# Patient Record
Sex: FEMALE | Race: WHITE | Employment: FULL TIME | URBAN - METROPOLITAN AREA
[De-identification: names, ages, dates, MRNs, and addresses within clinical notes are randomized per-mention and may not be internally consistent; named-entity substitution may affect disease eponyms.]

---

## 2017-01-23 ENCOUNTER — TELEPHONE (OUTPATIENT)
Dept: NEUROLOGY | Facility: CLINIC | Age: 37
End: 2017-01-23

## 2017-01-23 NOTE — TELEPHONE ENCOUNTER
Viridiana WISE requested by [facility] pharmacy via cover my meds with [address]

## 2017-01-25 NOTE — TELEPHONE ENCOUNTER
Fax received from [address] [place] [address]. Authorization received for Adderall XR. Approval effective 01/24/2017 through 01/24/2020, as long as patient remains covered by current prescription drug plan.     Plan member ID: [id number]

## 2017-02-15 NOTE — TELEPHONE ENCOUNTER
Medication: Adderall XR 30 mg daily    Date of last refill: 11/11/2016 90 day panel  [place]  68/45/2206 fpr 30/30 day supply    Last office visit: 11/28/2016  Due back to clinic per last office note:  RTC in 6 months  D

## 2017-02-22 DIAGNOSIS — G43.109 MIGRAINE EQUIVALENT: Primary | ICD-10-CM

## 2017-02-22 RX ORDER — TOPIRAMATE 100 MG/1
100 TABLET, FILM COATED ORAL
Qty: 90 TABLET | Refills: 1 | Status: SHIPPED | OUTPATIENT
Start: 2017-02-22 | End: 2017-11-03 | Stop reason: DRUGHIGH

## 2017-02-22 NOTE — TELEPHONE ENCOUNTER
90 day panel set up for patient. Given prescriptions for March, April and May. Did not receive the Feb prescription. Patient requesting prescription for Feb.    Mother may  prescription for patient.

## 2017-02-22 NOTE — TELEPHONE ENCOUNTER
Medication:Topirimate 100 mg    Date of last refill: 03/09/16 # 90 with 3 addt refills  [place]      Last office visit: 11/28/2016  Due back to clinic per last office note:  RTC in 6 months  Date next office visit schedu

## 2017-03-16 RX ORDER — RIZATRIPTAN BENZOATE 10 MG/1
TABLET ORAL
Qty: 27 TABLET | Refills: 1 | Status: SHIPPED | OUTPATIENT
Start: 2017-03-16 | End: 2017-06-01

## 2017-03-16 NOTE — TELEPHONE ENCOUNTER
Medication:Rizatriptan    Date of last refill: 11/28/16 # 27  [place]      Last office visit: 11/28/2016  Due back to clinic per last office note:  RTC in 6 months  Date next office visit scheduled:  5/15/2017    Last OV

## 2017-05-15 NOTE — PROGRESS NOTES
[facility] with [address]  4/28/1980  Primary Care Provider:  Lucien Birch MD    5/15/2017    30year old yo patient being seen for:  Migraine and ADD    One day in December, h mmHg  Pulse 65  Resp 16  Ht 66.5\"  Wt 274 lb  BMI 43.57 kg/m2  Looks stated age  Pink conjunctiva anicteric sclerae, moist mucosa  No LAD, no thyromegaly  Lungs clear breath sounds  Heart S1S2, no abnormal sounds  Pink nailbeds no Cyanosis, pulses palpate

## 2017-05-15 NOTE — PATIENT INSTRUCTIONS
Refill policies:    • Allow 2 business days for refills; controlled substances may take longer.   • Contact your pharmacy at least 5 days prior to running out of medication and have them send an electronic request or submit request through the “request re insurance carrier to obtain pre-certification or prior authorization. Unfortunately, [facility] has seen an increase in denial of payment even though the procedure/test has been pre-certified.   You are strongly encouraged to contact your insurance carrier to v

## 2017-05-19 ENCOUNTER — EXTERNAL RECORD (OUTPATIENT)
Dept: HEALTH INFORMATION MANAGEMENT | Facility: OTHER | Age: 37
End: 2017-05-19

## 2017-05-24 NOTE — TELEPHONE ENCOUNTER
Prescriptions printed at office visit with expiration date of May 15, 2017. Pharmacy will not fill. Patient to present to office for new prescriptions and return previous prescriptions. Patient requesting the 90 day panel.

## 2017-05-24 NOTE — TELEPHONE ENCOUNTER
Patient returned prescriptions in exchange for new printed Adderall scripts. Prescriptions destroyed by nursing.

## 2017-06-01 RX ORDER — RIZATRIPTAN BENZOATE 10 MG/1
TABLET ORAL
Qty: 27 TABLET | Refills: 1 | Status: SHIPPED | OUTPATIENT
Start: 2017-06-01 | End: 2017-09-12

## 2017-06-01 NOTE — TELEPHONE ENCOUNTER
Medication: Rizatriptan 10mg    Date of last refill: 03/16/17 with 1 addt refill #27   [place]     Last office visit: 5/15/2017  Due back to clinic per last office note:  RTN in 6 months by 11/15/17  Date next office vis

## 2017-09-02 ENCOUNTER — PRIOR ORIGINAL RECORDS (OUTPATIENT)
Dept: OTHER | Age: 37
End: 2017-09-02

## 2017-09-02 ENCOUNTER — HOSPITAL ENCOUNTER (OUTPATIENT)
Age: 37
Discharge: HOME | End: 2017-09-02
Attending: FAMILY MEDICINE
Payer: COMMERCIAL

## 2017-09-02 VITALS
BODY MASS INDEX: 45 KG/M2 | DIASTOLIC BLOOD PRESSURE: 57 MMHG | SYSTOLIC BLOOD PRESSURE: 120 MMHG | RESPIRATION RATE: 16 BRPM | HEART RATE: 84 BPM | WEIGHT: 285 LBS | OXYGEN SATURATION: 98 % | TEMPERATURE: 97 F

## 2017-09-02 DIAGNOSIS — R00.2 PALPITATIONS: Primary | ICD-10-CM

## 2017-09-02 DIAGNOSIS — F41.9 ANXIETY: ICD-10-CM

## 2017-09-02 LAB
#LYMPHOCYTE IC: 1.6 X10ˆ3/UL (ref 0.9–3.2)
#MXD IC: 0.6 X10ˆ3/UL (ref 0.1–1)
#NEUTROPHIL IC: 4.6 X10ˆ3/UL (ref 1.3–6.7)
ATRIAL RATE: 71 BPM
CREAT SERPL-MCNC: 0.6 MG/DL (ref 0.4–1)
GLUCOSE BLD-MCNC: 95 MG/DL (ref 65–99)
HCT IC: 43.4 % (ref 37–54)
HGB IC: 14.5 G/DL (ref 12–16)
ISTAT BLOOD GAS TCO2: 25 MMOL/L (ref 22–32)
ISTAT BUN: 8 MG/DL (ref 8–20)
ISTAT CHLORIDE: 102 MMOL/L (ref 101–111)
ISTAT HEMATOCRIT: 44 % (ref 37–54)
ISTAT IONIZED CALCIUM: 1.19 MMOL/L (ref 1.12–1.32)
ISTAT POTASSIUM: 4.1 MMOL/L (ref 3.6–5.1)
ISTAT SODIUM: 139 MMOL/L (ref 136–144)
ISTAT TROPONIN: <0.1 NG/ML (ref ?–0.1)
LYMPHOCYTES NFR BLD AUTO: 24 %
MCH IC: 28.7 PG (ref 27–33.2)
MCHC IC: 33.4 G/DL (ref 31–37)
MCV IC: 85.9 FL (ref 81–100)
MIXED CELL %: 9.5 %
NEUTROPHILS NFR BLD AUTO: 66.5 %
P AXIS: -10 DEGREES
P-R INTERVAL: 136 MS
PLT IC: 343 X10ˆ3/UL (ref 150–450)
Q-T INTERVAL: 382 MS
QRS DURATION: 78 MS
QTC CALCULATION (BEZET): 415 MS
R AXIS: 39 DEGREES
RBC IC: 5.05 X10ˆ6/UL (ref 3.8–5.1)
T AXIS: 19 DEGREES
TSI SER-ACNC: 2.46 MIU/ML (ref 0.35–5.5)
VENTRICULAR RATE: 71 BPM
WBC IC: 6.8 X10ˆ3/UL (ref 4–13)

## 2017-09-02 PROCEDURE — 93005 ELECTROCARDIOGRAM TRACING: CPT

## 2017-09-02 PROCEDURE — 84484 ASSAY OF TROPONIN QUANT: CPT

## 2017-09-02 PROCEDURE — 84443 ASSAY THYROID STIM HORMONE: CPT | Performed by: FAMILY MEDICINE

## 2017-09-02 PROCEDURE — 80047 BASIC METABLC PNL IONIZED CA: CPT

## 2017-09-02 PROCEDURE — 85025 COMPLETE CBC W/AUTO DIFF WBC: CPT | Performed by: FAMILY MEDICINE

## 2017-09-02 PROCEDURE — 99204 OFFICE O/P NEW MOD 45 MIN: CPT

## 2017-09-02 PROCEDURE — 36415 COLL VENOUS BLD VENIPUNCTURE: CPT

## 2017-09-02 PROCEDURE — 93010 ELECTROCARDIOGRAM REPORT: CPT

## 2017-09-02 RX ORDER — ALPRAZOLAM 0.25 MG/1
0.25 TABLET ORAL 3 TIMES DAILY PRN
Qty: 15 TABLET | Refills: 0 | Status: SHIPPED | OUTPATIENT
Start: 2017-09-02 | End: 2017-09-17

## 2017-09-02 NOTE — ED PROVIDER NOTES
Patient Seen in: [id number] [facility]    History   Patient presents with:  Arrythmia/Palpitations (cardiovascular)    Stated Complaint: heart palpitation    HPI    79-year-old female presents to the clinic today with chief complaints of interm cognitive impairment, so stated    • Nontoxic uninodular goiter    • Other B-complex deficiencies    • Other nonspecific findings on examination of blood(790.99)        Past Surgical History:  08/24/2016: ANKLE FRACTURE SURGERY      Comment: Right  2006:  B Temporal  SpO2: 98 %  O2 Device: None (Room air)    Current:/57   Pulse 84   Temp (!) 97.1 °F (36.2 °C) (Temporal)   Resp 16   Wt 129.3 kg   SpO2 98%   BMI 45.31 kg/m²         Physical Exam    General appearance: alert, appears stated age and coopera versus hypothyroidism. She is currently on the Synthroid. Last TSH was in May and noted to be normal. Await TSH results. Patient was also instructed to follow-up with PCP and consider getting a Holter monitor done to rule out any cardiac arrhythmias.   P

## 2017-09-02 NOTE — ED NOTES
Patient is tearful during assessment. Denies pain. States she is under a lot of stress lately. No sob, dizziness, nausea, or cp at the moment.

## 2017-09-02 NOTE — ED INITIAL ASSESSMENT (HPI)
Patient states she has been having intermittent palpitations for the past few weeks. States last night while lying on her cough, she noticed that she felt the palpitations. Patient denies sob, dizziness, nausea, and cp.  States she has been under a lot of s

## 2017-09-05 ENCOUNTER — PRIOR ORIGINAL RECORDS (OUTPATIENT)
Dept: OTHER | Age: 37
End: 2017-09-05

## 2017-09-05 ENCOUNTER — TELEPHONE (OUTPATIENT)
Dept: NEUROLOGY | Facility: CLINIC | Age: 37
End: 2017-09-05

## 2017-09-05 LAB
BUN: 8 MG/DL
CHLORIDE: 102 MEQ/L
CREATININE, SERUM: 0.6 MG/DL
GLUCOSE: 95 MG/DL
HEMATOCRIT: 43.4 %
HEMOGLOBIN: 14.5 G/DL
PLATELETS: 343 K/UL
POTASSIUM, SERUM: 4.1 MEQ/L
RED BLOOD COUNT: 5.05 X 10-6/U
SODIUM: 139 MEQ/L
THYROID STIMULATING HORMONE: 2.46 MLU/L
WHITE BLOOD COUNT: 6.8 X 10-3/U

## 2017-09-05 NOTE — TELEPHONE ENCOUNTER
Medication: adderall    Date of last refill: 8/22/17  [place] 9/07/89    Last office visit: 5/15/17  Due back to clinic per last office note:  6 monhts  Date next office visit scheduled:  Future Appointments  Date Time P

## 2017-09-05 NOTE — TELEPHONE ENCOUNTER
From: Manny Blankenship  To: Jewels Lovelace [place]  Sent: 9/5/2017 7:57 AM CDT  Subject: Prescription Question    Rx request for Adderall 20mg time release (at least that is what I think my dosage is).  For Sept, Oct, & Nov. last time filled was the end of July

## 2017-09-07 ENCOUNTER — HOSPITAL ENCOUNTER (OUTPATIENT)
Dept: CV DIAGNOSTICS | Facility: HOSPITAL | Age: 37
Discharge: HOME | End: 2017-09-07
Attending: INTERNAL MEDICINE
Payer: COMMERCIAL

## 2017-09-07 DIAGNOSIS — R00.2 PALPITATIONS: ICD-10-CM

## 2017-09-07 PROCEDURE — 93271 ECG/MONITORING AND ANALYSIS: CPT | Performed by: INTERNAL MEDICINE

## 2017-09-07 PROCEDURE — 93270 REMOTE 30 DAY ECG REV/REPORT: CPT | Performed by: INTERNAL MEDICINE

## 2017-09-07 PROCEDURE — 93272 ECG/REVIEW INTERPRET ONLY: CPT | Performed by: INTERNAL MEDICINE

## 2017-09-12 DIAGNOSIS — G43.109 MIGRAINE EQUIVALENT: Primary | ICD-10-CM

## 2017-09-12 RX ORDER — RIZATRIPTAN BENZOATE 10 MG/1
TABLET ORAL
Qty: 27 TABLET | Refills: 0 | Status: SHIPPED | OUTPATIENT
Start: 2017-09-12 | End: 2017-12-01

## 2017-09-12 NOTE — TELEPHONE ENCOUNTER
Medication: Rizatriptan    Date of last refill: 6/1/17 #27 with 1 refill  [place]    Last office visit: 5/15/2017  Due back to clinic per last office note:  6 mos  Date next office visit scheduled:  Future Appointment

## 2017-10-03 ENCOUNTER — HOSPITAL ENCOUNTER (OUTPATIENT)
Dept: CV DIAGNOSTICS | Facility: HOSPITAL | Age: 37
Discharge: HOME | End: 2017-10-03
Attending: INTERNAL MEDICINE

## 2017-10-03 DIAGNOSIS — R00.2 PALPITATIONS: ICD-10-CM

## 2017-10-19 ENCOUNTER — PRIOR ORIGINAL RECORDS (OUTPATIENT)
Dept: OTHER | Age: 37
End: 2017-10-19

## 2017-10-27 ENCOUNTER — PRIOR ORIGINAL RECORDS (OUTPATIENT)
Dept: OTHER | Age: 37
End: 2017-10-27

## 2017-11-03 RX ORDER — TOPIRAMATE 25 MG/1
TABLET ORAL
Qty: 90 TABLET | Refills: 2 | Status: SHIPPED | OUTPATIENT
Start: 2017-11-03 | End: 2017-11-13

## 2017-11-13 ENCOUNTER — OFFICE VISIT (OUTPATIENT)
Dept: NEUROLOGY | Facility: CLINIC | Age: 37
End: 2017-11-13

## 2017-11-13 VITALS
BODY MASS INDEX: 46.53 KG/M2 | HEIGHT: 66.5 IN | SYSTOLIC BLOOD PRESSURE: 134 MMHG | HEART RATE: 82 BPM | RESPIRATION RATE: 16 BRPM | WEIGHT: 293 LBS | DIASTOLIC BLOOD PRESSURE: 79 MMHG

## 2017-11-13 DIAGNOSIS — G43.109 MIGRAINE EQUIVALENT: Primary | ICD-10-CM

## 2017-11-13 DIAGNOSIS — F98.8 ATTENTION DEFICIT DISORDER (ADD) WITHOUT HYPERACTIVITY: ICD-10-CM

## 2017-11-13 DIAGNOSIS — F90.8 ATTENTION DEFICIT HYPERACTIVITY DISORDER (ADHD), OTHER TYPE: ICD-10-CM

## 2017-11-13 PROCEDURE — 99214 OFFICE O/P EST MOD 30 MIN: CPT | Performed by: OTHER

## 2017-11-13 RX ORDER — DEXTROAMPHETAMINE SACCHARATE, AMPHETAMINE ASPARTATE MONOHYDRATE, DEXTROAMPHETAMINE SULFATE AND AMPHETAMINE SULFATE 5; 5; 5; 5 MG/1; MG/1; MG/1; MG/1
20 CAPSULE, EXTENDED RELEASE ORAL DAILY
Qty: 30 CAPSULE | Refills: 0 | Status: SHIPPED | OUTPATIENT
Start: 2017-11-13 | End: 2017-11-13

## 2017-11-13 RX ORDER — LEVOTHYROXINE SODIUM 0.1 MG/1
100 TABLET ORAL
COMMUNITY
End: 2019-12-19 | Stop reason: DRUGHIGH

## 2017-11-13 RX ORDER — DEXTROAMPHETAMINE SACCHARATE, AMPHETAMINE ASPARTATE MONOHYDRATE, DEXTROAMPHETAMINE SULFATE AND AMPHETAMINE SULFATE 5; 5; 5; 5 MG/1; MG/1; MG/1; MG/1
20 CAPSULE, EXTENDED RELEASE ORAL DAILY
Qty: 30 CAPSULE | Refills: 0 | Status: SHIPPED | OUTPATIENT
Start: 2017-12-13 | End: 2017-11-13

## 2017-11-13 RX ORDER — DEXTROAMPHETAMINE SACCHARATE, AMPHETAMINE ASPARTATE MONOHYDRATE, DEXTROAMPHETAMINE SULFATE AND AMPHETAMINE SULFATE 5; 5; 5; 5 MG/1; MG/1; MG/1; MG/1
20 CAPSULE, EXTENDED RELEASE ORAL DAILY
Qty: 30 CAPSULE | Refills: 0 | Status: SHIPPED | OUTPATIENT
Start: 2018-01-13 | End: 2018-02-06

## 2017-11-13 NOTE — PATIENT INSTRUCTIONS
Refill policies:    • Allow 2-3 business days for refills; controlled substances may take longer.   • Contact your pharmacy at least 5 days prior to running out of medication and have them send an electronic request or submit request through the [facility] have a procedure or additional testing performed. [facility]) will contact your insurance carrier to obtain pre-certification or prior authorization.     Unfortunately, [facility] has seen an increase in denial of payment even though the p

## 2017-11-13 NOTE — PROGRESS NOTES
[facility] with [address]  4/28/1980  Primary Care Provider:  New Chapman MD    11/13/2017    30year old yo patient being seen for:  migraines    Averages 3 a month  No cross S1S2, no abnormal sounds  Pink nailbeds no Cyanosis, pulses palpated    Neuro: Alert oriented with normal CN 2-12. No motor and sensory deficits. DTRs were symmetric and no upper motor neuron signs.  Coordination was normal.      IMPRESSION & PLANS:  Curt Malone

## 2017-11-24 ENCOUNTER — CHARTING TRANS (OUTPATIENT)
Dept: OTHER | Age: 37
End: 2017-11-24

## 2017-12-01 DIAGNOSIS — G43.109 MIGRAINE EQUIVALENT: ICD-10-CM

## 2017-12-01 RX ORDER — RIZATRIPTAN BENZOATE 10 MG/1
TABLET ORAL
Qty: 27 TABLET | Refills: 0 | Status: SHIPPED | OUTPATIENT
Start: 2017-12-01 | End: 2018-01-13

## 2018-01-09 ENCOUNTER — IMAGING SERVICES (OUTPATIENT)
Dept: OTHER | Age: 38
End: 2018-01-09

## 2018-01-09 ENCOUNTER — CHARTING TRANS (OUTPATIENT)
Dept: OTHER | Age: 38
End: 2018-01-09

## 2018-01-13 DIAGNOSIS — G43.109 MIGRAINE EQUIVALENT: ICD-10-CM

## 2018-01-15 RX ORDER — RIZATRIPTAN BENZOATE 10 MG/1
TABLET ORAL
Qty: 27 TABLET | Refills: 1 | Status: SHIPPED | OUTPATIENT
Start: 2018-01-15 | End: 2018-04-04

## 2018-01-15 NOTE — TELEPHONE ENCOUNTER
Spoke to patient and she only got 18 tabs out and she may not have insurance in a month and would like a refill

## 2018-01-15 NOTE — TELEPHONE ENCOUNTER
Medication: Rizatriptan 10 mg    Date of last refill: 12/01/17  [place]     Last office visit: 11/13/2017  Due back to clinic per last office note:  RTN in 6 months  Date next office visit scheduled:  Future Appointments

## 2018-01-18 DIAGNOSIS — G43.109 MIGRAINE EQUIVALENT: Primary | ICD-10-CM

## 2018-01-18 RX ORDER — TOPIRAMATE 25 MG/1
TABLET ORAL
Qty: 270 TABLET | Refills: 0 | Status: SHIPPED | OUTPATIENT
Start: 2018-01-18 | End: 2018-11-13 | Stop reason: DRUGHIGH

## 2018-01-18 NOTE — TELEPHONE ENCOUNTER
Medication:Topiramate 25 mg     Date of last refill: 11/03/17 with 2 addt refills  [place]      Last office visit: 11/13/2017  Due back to clinic per last office note:  RTN in 6 months  Date next office visit scheduled:

## 2018-01-18 NOTE — TELEPHONE ENCOUNTER
Spoke to patient and she is requesting a 90 day supply on Topomax due to not may have insurance.  She is aware that she is to wean off per last visit with Dr Castillo Cowart

## 2018-01-30 ENCOUNTER — LAB SERVICES (OUTPATIENT)
Dept: OTHER | Age: 38
End: 2018-01-30

## 2018-01-30 LAB — PREGNANCY TEST, URINE: NEGATIVE

## 2018-01-31 ENCOUNTER — CHARTING TRANS (OUTPATIENT)
Dept: OTHER | Age: 38
End: 2018-01-31

## 2018-02-01 LAB — AP REPORT: NORMAL

## 2018-02-03 ENCOUNTER — PATIENT MESSAGE (OUTPATIENT)
Dept: NEUROLOGY | Facility: CLINIC | Age: 38
End: 2018-02-03

## 2018-02-05 ENCOUNTER — CHARTING TRANS (OUTPATIENT)
Dept: OTHER | Age: 38
End: 2018-02-05

## 2018-02-05 NOTE — TELEPHONE ENCOUNTER
From: Kilo Lunch  To: Eliazar Collet, MD  Sent: 2/3/2018 1:14 AM CST  Subject: Prescription Question    Rx request for Adderall 20mg time release. I am normally given 3 scripts at a time. I am due for another fill in 2/14?     Can you please send me

## 2018-02-06 ENCOUNTER — OFFICE VISIT (OUTPATIENT)
Dept: NEUROLOGY | Facility: CLINIC | Age: 38
End: 2018-02-06

## 2018-02-06 VITALS
DIASTOLIC BLOOD PRESSURE: 80 MMHG | HEIGHT: 66.5 IN | RESPIRATION RATE: 16 BRPM | BODY MASS INDEX: 46.53 KG/M2 | SYSTOLIC BLOOD PRESSURE: 124 MMHG | HEART RATE: 82 BPM | WEIGHT: 293 LBS

## 2018-02-06 DIAGNOSIS — G43.109 MIGRAINE EQUIVALENT: Primary | ICD-10-CM

## 2018-02-06 DIAGNOSIS — F90.0 ATTENTION DEFICIT HYPERACTIVITY DISORDER (ADHD), PREDOMINANTLY INATTENTIVE TYPE: ICD-10-CM

## 2018-02-06 DIAGNOSIS — F98.8 ATTENTION DEFICIT DISORDER (ADD) WITHOUT HYPERACTIVITY: ICD-10-CM

## 2018-02-06 PROCEDURE — 99214 OFFICE O/P EST MOD 30 MIN: CPT | Performed by: OTHER

## 2018-02-06 RX ORDER — TRAMADOL HYDROCHLORIDE 50 MG/1
50 TABLET ORAL EVERY 6 HOURS PRN
Qty: 30 TABLET | Refills: 0 | Status: SHIPPED | OUTPATIENT
Start: 2018-02-06 | End: 2018-05-02

## 2018-02-06 RX ORDER — DEXTROAMPHETAMINE SACCHARATE, AMPHETAMINE ASPARTATE MONOHYDRATE, DEXTROAMPHETAMINE SULFATE AND AMPHETAMINE SULFATE 5; 5; 5; 5 MG/1; MG/1; MG/1; MG/1
20 CAPSULE, EXTENDED RELEASE ORAL DAILY
Qty: 30 CAPSULE | Refills: 0 | Status: SHIPPED | OUTPATIENT
Start: 2018-02-14 | End: 2018-02-06

## 2018-02-06 RX ORDER — DEXTROAMPHETAMINE SACCHARATE, AMPHETAMINE ASPARTATE MONOHYDRATE, DEXTROAMPHETAMINE SULFATE AND AMPHETAMINE SULFATE 5; 5; 5; 5 MG/1; MG/1; MG/1; MG/1
20 CAPSULE, EXTENDED RELEASE ORAL DAILY
Qty: 30 CAPSULE | Refills: 0 | Status: SHIPPED | OUTPATIENT
Start: 2018-03-14 | End: 2018-02-06

## 2018-02-06 RX ORDER — AMOXICILLIN AND CLAVULANATE POTASSIUM 875; 125 MG/1; MG/1
1 TABLET, FILM COATED ORAL 2 TIMES DAILY
COMMUNITY
End: 2018-05-02

## 2018-02-06 RX ORDER — METHYLPREDNISOLONE 4 MG/1
TABLET ORAL
Qty: 1 PACKAGE | Refills: 0 | Status: SHIPPED | OUTPATIENT
Start: 2018-02-06 | End: 2018-05-02

## 2018-02-06 RX ORDER — DEXTROAMPHETAMINE SACCHARATE, AMPHETAMINE ASPARTATE MONOHYDRATE, DEXTROAMPHETAMINE SULFATE AND AMPHETAMINE SULFATE 5; 5; 5; 5 MG/1; MG/1; MG/1; MG/1
20 CAPSULE, EXTENDED RELEASE ORAL DAILY
Qty: 30 CAPSULE | Refills: 0 | Status: SHIPPED | OUTPATIENT
Start: 2018-04-14 | End: 2018-05-02

## 2018-02-06 NOTE — PROGRESS NOTES
[facility] with [address]  4/28/1980  Primary Care Provider:  Heriberto Uriarte MD    2/6/2018    30year old yo patient being seen for:  headaches    Had turbinate surgery Thursday 50 mg by mouth daily. , Disp: , Rfl:   PRN:     Allergies:    Acetaminophen           Other (See Comments)    Comment:Elevated liver enzymes      During today's visit, the following items were reviewed: medications, and the following relevant information ar caregivers - -non F2F  (  ) Telephone time with patiern or authorized [employer]  (  ) other records reviewed --non F2F  (  ) Fam meetings - patient not present --non F2F  Non Face to Face CPT code 46994/49231 applies as documented above    PROCED

## 2018-02-06 NOTE — PATIENT INSTRUCTIONS
Refill policies:    • Allow 2-3 business days for refills; controlled substances may take longer.   • Contact your pharmacy at least 5 days prior to running out of medication and have them send an electronic request or submit request through the [facility] recommended that you have a procedure or additional testing performed. [facility]) will contact your insurance carrier to obtain pre-certification or prior authorization.     Unfortunately, [facility] has seen an increase in denial of paym

## 2018-04-04 DIAGNOSIS — G43.109 MIGRAINE EQUIVALENT: ICD-10-CM

## 2018-04-04 RX ORDER — RIZATRIPTAN BENZOATE 10 MG/1
TABLET ORAL
Qty: 9 TABLET | Refills: 0 | Status: SHIPPED | OUTPATIENT
Start: 2018-04-04 | End: 2018-04-15

## 2018-04-04 NOTE — TELEPHONE ENCOUNTER
Spoke to [address] [place] [address] pharmacist  To see if they received the script on 1/15/18. [address] [place] [address] informed me that patient filled it in January, February and in March. Patient will need a refill for April.

## 2018-04-04 NOTE — TELEPHONE ENCOUNTER
Attempted to reach pharmacy but they are closed. to see if they received the script on 1/15/18 qty 27 with 1 refill. Patient should have enough to get her to her appointment in May. Will try the pharmacy again when they open.        Medication: Rizatriptan

## 2018-04-15 DIAGNOSIS — G43.109 MIGRAINE EQUIVALENT: ICD-10-CM

## 2018-04-16 RX ORDER — RIZATRIPTAN BENZOATE 10 MG/1
TABLET ORAL
Qty: 9 TABLET | Refills: 0 | Status: SHIPPED | OUTPATIENT
Start: 2018-04-16 | End: 2018-05-02

## 2018-04-16 NOTE — TELEPHONE ENCOUNTER
Medication: Rizatriptan 10mg      Date of last refill: 04/04/18  [place]      Last office visit: 2/6/18  Due back to clinic per last office note:   Follow up  for special test  /or keep scheduled appointment  Date next of

## 2018-05-02 NOTE — PROGRESS NOTES
[facility] with [address]  4/28/1980  Primary Care Provider:  Rich Fraser MD    5/2/2018    41year old yo patient being seen for:  Migraine and ADD    Was in [place] for tra 130/60 (BP Location: Left arm, Patient Position: Sitting, Cuff Size: large)   Pulse 79   Resp 16   Ht 66.5\"   Wt 293 lb   BMI 46.58 kg/m²   Looks stated age  Pink conjunctiva anicteric sclerae, moist mucosa  No LAD, neck supple  Lungs clear breath sounds Frida Candelario MD  Vascular & General Neurology  Director, Multiple Sclerosis Program  [facility]  5/2/2018, Time completed 11:57 AM    Decision making:  (  ) labs reviewed/ordered - 1  (  ) new diagnosis: - 1  (  ) Images & studies independ

## 2018-06-08 ENCOUNTER — PRIOR ORIGINAL RECORDS (OUTPATIENT)
Dept: OTHER | Age: 38
End: 2018-06-08

## 2018-06-19 ENCOUNTER — HOSPITAL ENCOUNTER (OUTPATIENT)
Dept: CV DIAGNOSTICS | Facility: HOSPITAL | Age: 38
Discharge: HOME | End: 2018-06-19
Attending: INTERNAL MEDICINE
Payer: COMMERCIAL

## 2018-06-19 DIAGNOSIS — R07.9 CHEST PAIN, UNSPECIFIED: ICD-10-CM

## 2018-06-19 PROCEDURE — 93018 CV STRESS TEST I&R ONLY: CPT | Performed by: INTERNAL MEDICINE

## 2018-06-19 PROCEDURE — 93017 CV STRESS TEST TRACING ONLY: CPT | Performed by: INTERNAL MEDICINE

## 2018-06-19 PROCEDURE — 93350 STRESS TTE ONLY: CPT | Performed by: INTERNAL MEDICINE

## 2018-06-22 ENCOUNTER — PRIOR ORIGINAL RECORDS (OUTPATIENT)
Dept: OTHER | Age: 38
End: 2018-06-22

## 2018-08-07 ENCOUNTER — PRIOR ORIGINAL RECORDS (OUTPATIENT)
Dept: OTHER | Age: 38
End: 2018-08-07

## 2018-08-13 NOTE — TELEPHONE ENCOUNTER
From: Ashok Porter  To: Lexie Mcintyre MD  Sent: 8/11/2018 4:59 PM CDT  Subject: Prescription Question    Request for Adderall RX refill. 45TJ. Due for new refill 8/23/18.      If possible request for 3 month of Rx     Also is Dr. Christal Worthington schedule open for

## 2018-08-13 NOTE — TELEPHONE ENCOUNTER
Medication: adderall XR (90 day supply)    Date of last refill: 5/2/18 (90 day supply)  [place] 2/29/83    Last office visit: 5/2/2018  Due back to clinic per last office note:  6 months  Date next office visit scheduled

## 2018-09-18 DIAGNOSIS — G43.109 MIGRAINE EQUIVALENT: ICD-10-CM

## 2018-09-18 RX ORDER — RIZATRIPTAN BENZOATE 10 MG/1
TABLET ORAL
Qty: 9 TABLET | Refills: 5 | Status: SHIPPED | OUTPATIENT
Start: 2018-09-18 | End: 2018-11-27

## 2018-09-18 NOTE — TELEPHONE ENCOUNTER
Medication: Rizatriptan 10 mg    Date of last refill: 05/22/18 with 5 addt refills  [place]     Last office visit: 5/2/2018  Due back to clinic per last office note:  RTN in 6 months  Date next office visit scheduled: (  ) Case/studies discussed with other caregivers - -non F2F  (  ) Telephone time with patiern or authorized [employer]  (  ) other records reviewed --non F2F  (  ) Fam meetings - patient not present --non F2F  Non Face to Face CPT code 99529/5363

## 2018-09-20 ENCOUNTER — PRIOR ORIGINAL RECORDS (OUTPATIENT)
Dept: OTHER | Age: 38
End: 2018-09-20

## 2018-10-24 ENCOUNTER — MYAURORA ACCOUNT LINK (OUTPATIENT)
Dept: OTHER | Age: 38
End: 2018-10-24

## 2018-10-24 ENCOUNTER — CHARTING TRANS (OUTPATIENT)
Dept: OTHER | Age: 38
End: 2018-10-24

## 2018-10-26 ENCOUNTER — CHARTING TRANS (OUTPATIENT)
Dept: OTHER | Age: 38
End: 2018-10-26

## 2018-10-26 ENCOUNTER — PRIOR ORIGINAL RECORDS (OUTPATIENT)
Dept: OTHER | Age: 38
End: 2018-10-26

## 2018-10-26 ENCOUNTER — MYAURORA ACCOUNT LINK (OUTPATIENT)
Dept: OTHER | Age: 38
End: 2018-10-26

## 2018-11-08 LAB
ALBUMIN: 4 G/DL
ALKALINE PHOSPHATATE(ALK PHOS): 75 IU/L
BILIRUBIN TOTAL: 0.7 MG/DL
BUN: 0.7 MG/DL
CALCIUM: 9 MG/DL
CHLORIDE: 106 MEQ/L
CHOLESTEROL, TOTAL: 163 MG/DL
FREE T4: 1.33 MG/DL
GLUCOSE: 86 MG/DL
HDL CHOLESTEROL: 38 MG/DL
HEMATOCRIT: 44 %
HEMOGLOBIN: 14 G/DL
IRON, TOTAL: 52 MCG/DL
LDL CHOLESTEROL: 115 MG/DL
PLATELETS: 385 K/UL
POTASSIUM, SERUM: 4.6 MEQ/L
PROTEIN, TOTAL: 6.4 G/DL
RED BLOOD COUNT: 5.2 X 10-6/U
SGOT (AST): 15 IU/L
SGPT (ALT): 10 IU/L
SODIUM: 141 MEQ/L
THYROID STIMULATING HORMONE: 4.28 MLU/L
TRIGLYCERIDES: 51 MG/DL
URIC ACID: 9 MG/DL
VITAMIN D 25-OH: 54.6 NG/ML
WHITE BLOOD COUNT: 7.2 X 10-3/U

## 2018-11-13 ENCOUNTER — OFFICE VISIT (OUTPATIENT)
Dept: NEUROLOGY | Facility: CLINIC | Age: 38
End: 2018-11-13
Payer: COMMERCIAL

## 2018-11-13 VITALS
RESPIRATION RATE: 16 BRPM | WEIGHT: 288 LBS | HEART RATE: 78 BPM | DIASTOLIC BLOOD PRESSURE: 78 MMHG | SYSTOLIC BLOOD PRESSURE: 122 MMHG | HEIGHT: 66.5 IN | BODY MASS INDEX: 45.74 KG/M2

## 2018-11-13 DIAGNOSIS — G43.009 MIGRAINE WITHOUT AURA AND WITHOUT STATUS MIGRAINOSUS, NOT INTRACTABLE: Primary | ICD-10-CM

## 2018-11-13 DIAGNOSIS — F90.0 ATTENTION DEFICIT HYPERACTIVITY DISORDER (ADHD), PREDOMINANTLY INATTENTIVE TYPE: ICD-10-CM

## 2018-11-13 DIAGNOSIS — F98.8 ATTENTION DEFICIT DISORDER (ADD) WITHOUT HYPERACTIVITY: ICD-10-CM

## 2018-11-13 PROCEDURE — 99214 OFFICE O/P EST MOD 30 MIN: CPT | Performed by: OTHER

## 2018-11-13 RX ORDER — DEXTROAMPHETAMINE SACCHARATE, AMPHETAMINE ASPARTATE MONOHYDRATE, DEXTROAMPHETAMINE SULFATE AND AMPHETAMINE SULFATE 5; 5; 5; 5 MG/1; MG/1; MG/1; MG/1
20 CAPSULE, EXTENDED RELEASE ORAL DAILY
Qty: 30 CAPSULE | Refills: 0 | Status: SHIPPED | OUTPATIENT
Start: 2018-11-20 | End: 2018-11-13

## 2018-11-13 RX ORDER — DEXTROAMPHETAMINE SACCHARATE, AMPHETAMINE ASPARTATE MONOHYDRATE, DEXTROAMPHETAMINE SULFATE AND AMPHETAMINE SULFATE 5; 5; 5; 5 MG/1; MG/1; MG/1; MG/1
20 CAPSULE, EXTENDED RELEASE ORAL DAILY
Qty: 30 CAPSULE | Refills: 0 | Status: SHIPPED | OUTPATIENT
Start: 2018-12-20 | End: 2019-01-19

## 2018-11-13 RX ORDER — DEXTROAMPHETAMINE SACCHARATE, AMPHETAMINE ASPARTATE MONOHYDRATE, DEXTROAMPHETAMINE SULFATE AND AMPHETAMINE SULFATE 5; 5; 5; 5 MG/1; MG/1; MG/1; MG/1
20 CAPSULE, EXTENDED RELEASE ORAL DAILY
Qty: 30 CAPSULE | Refills: 0 | Status: SHIPPED | OUTPATIENT
Start: 2019-01-20 | End: 2019-02-19

## 2018-11-13 NOTE — PATIENT INSTRUCTIONS
Refill policies:    • Allow 2-3 business days for refills; controlled substances may take longer.   • Contact your pharmacy at least 5 days prior to running out of medication and have them send an electronic request or submit request through the “request re entire amount billed. Precertification and Prior Authorizations: If your physician has recommended that you have a procedure or additional testing performed.   [facility]) will contact your insurance carrier to obtain pre-certi

## 2018-11-13 NOTE — PROGRESS NOTES
[facility] with [address]  4/28/1980  Primary Care Provider:  Darlean Dance, MD    11/13/2018  Accompanied visit:  (x) No ( ) yes, by:      41year old yo patient being seen for: conjunctiva anicteric sclerae, moist mucosa  No LAD, neck supple  Lungs clear breath sounds  Heart S1S2, no abnormal sounds  Pink nailbeds no Cyanosis, pulses palpated    Attention, reasoning, memory and comprehension are intact.   Language and speech ozzy making:  (  ) labs reviewed/ordered - 1  (  ) new diagnosis: - 1  (  ) Images & studies independently reviewed -non F2F  (  ) Case/studies discussed with other caregivers - -non F2F  (  ) Telephone time with patiern or authorized [employer]  (  )

## 2018-11-27 DIAGNOSIS — G43.109 MIGRAINE EQUIVALENT: ICD-10-CM

## 2018-11-27 RX ORDER — RIZATRIPTAN BENZOATE 10 MG/1
TABLET ORAL
Qty: 9 TABLET | Refills: 5 | Status: SHIPPED | OUTPATIENT
Start: 2018-11-27 | End: 2019-07-24

## 2018-11-27 NOTE — TELEPHONE ENCOUNTER
Medication: Rizatriptan 10 mg    Date of last refill: 09/18/18 with 5 addt refills  [place]     Last office visit: 11/13/2018  Due back to clinic per last office note:  RTN in 6 months  Date next office visit scheduled:

## 2018-12-06 RX ORDER — TOPIRAMATE 100 MG/1
TABLET, FILM COATED ORAL
Qty: 90 TABLET | Refills: 2 | Status: SHIPPED | OUTPATIENT
Start: 2018-12-06 | End: 2019-12-19 | Stop reason: DRUGHIGH

## 2018-12-06 NOTE — TELEPHONE ENCOUNTER
Medication: Topiramate 100mg     Date of last refill: 05/02/18 with 2 addt refills  [place]      Last office visit: 11/13/2018  Due back to clinic per last office note:  RTN in 6 months  Date next office visit schedul

## 2019-02-19 DIAGNOSIS — F98.8 ATTENTION DEFICIT DISORDER (ADD) WITHOUT HYPERACTIVITY: ICD-10-CM

## 2019-02-19 RX ORDER — DEXTROAMPHETAMINE SACCHARATE, AMPHETAMINE ASPARTATE MONOHYDRATE, DEXTROAMPHETAMINE SULFATE AND AMPHETAMINE SULFATE 5; 5; 5; 5 MG/1; MG/1; MG/1; MG/1
20 CAPSULE, EXTENDED RELEASE ORAL DAILY
Qty: 30 CAPSULE | Refills: 0 | Status: SHIPPED | OUTPATIENT
Start: 2019-02-19 | End: 2019-02-19 | Stop reason: SDUPTHER

## 2019-02-19 NOTE — TELEPHONE ENCOUNTER
Patient requested a 90 day panel and was only approved for 30 day supply. Will set up for 90 day panel.     Pended for provider approval.

## 2019-02-19 NOTE — TELEPHONE ENCOUNTER
Medication: Adderall 20 mg     Date of last refill: 01/20/19  [place]      Last office visit: 11/13/2018  Due back to clinic per last office note:  RTN in 6 months  Date next office visit scheduled:                  Lucho

## 2019-02-28 VITALS
WEIGHT: 289 LBS | DIASTOLIC BLOOD PRESSURE: 80 MMHG | SYSTOLIC BLOOD PRESSURE: 124 MMHG | BODY MASS INDEX: 45.36 KG/M2 | HEIGHT: 67 IN | HEART RATE: 72 BPM

## 2019-02-28 VITALS
DIASTOLIC BLOOD PRESSURE: 76 MMHG | HEART RATE: 84 BPM | WEIGHT: 293 LBS | SYSTOLIC BLOOD PRESSURE: 130 MMHG | BODY MASS INDEX: 45.46 KG/M2

## 2019-02-28 VITALS
BODY MASS INDEX: 45.99 KG/M2 | HEIGHT: 67 IN | WEIGHT: 293 LBS | DIASTOLIC BLOOD PRESSURE: 80 MMHG | SYSTOLIC BLOOD PRESSURE: 110 MMHG | HEART RATE: 80 BPM

## 2019-02-28 VITALS
BODY MASS INDEX: 45.52 KG/M2 | HEIGHT: 67 IN | HEART RATE: 64 BPM | WEIGHT: 290 LBS | DIASTOLIC BLOOD PRESSURE: 76 MMHG | SYSTOLIC BLOOD PRESSURE: 116 MMHG

## 2019-03-05 VITALS
DIASTOLIC BLOOD PRESSURE: 70 MMHG | WEIGHT: 292 LBS | SYSTOLIC BLOOD PRESSURE: 126 MMHG | BODY MASS INDEX: 44.25 KG/M2 | HEIGHT: 68 IN

## 2019-03-06 VITALS
HEIGHT: 68 IN | HEART RATE: 80 BPM | DIASTOLIC BLOOD PRESSURE: 84 MMHG | BODY MASS INDEX: 44.41 KG/M2 | SYSTOLIC BLOOD PRESSURE: 122 MMHG | WEIGHT: 293 LBS

## 2019-04-17 RX ORDER — DEXTROAMPHETAMINE SACCHARATE, AMPHETAMINE ASPARTATE, DEXTROAMPHETAMINE SULFATE AND AMPHETAMINE SULFATE 5; 5; 5; 5 MG/1; MG/1; MG/1; MG/1
TABLET ORAL
COMMUNITY
Start: 2017-09-05 | End: 2019-10-07 | Stop reason: CLARIF

## 2019-04-17 RX ORDER — TOPIRAMATE 50 MG/1
TABLET, FILM COATED ORAL
COMMUNITY
Start: 2017-09-05 | End: 2020-10-09

## 2019-04-17 RX ORDER — DILTIAZEM HYDROCHLORIDE 60 MG/1
TABLET, FILM COATED ORAL
COMMUNITY
Start: 2017-10-27 | End: 2019-10-07 | Stop reason: CLARIF

## 2019-04-17 RX ORDER — SPIRONOLACTONE 50 MG/1
TABLET, FILM COATED ORAL
COMMUNITY
Start: 2017-09-05 | End: 2023-08-14 | Stop reason: DRUGHIGH

## 2019-04-17 RX ORDER — RIZATRIPTAN BENZOATE 5 MG/1
TABLET, ORALLY DISINTEGRATING ORAL
COMMUNITY
Start: 2017-09-05 | End: 2022-01-24 | Stop reason: ALTCHOICE

## 2019-04-17 RX ORDER — LEVOTHYROXINE SODIUM 0.1 MG/1
1 TABLET ORAL DAILY
COMMUNITY
Start: 2017-10-27 | End: 2019-10-07 | Stop reason: CLARIF

## 2019-04-17 RX ORDER — OMEPRAZOLE 40 MG/1
CAPSULE, DELAYED RELEASE ORAL
COMMUNITY
Start: 2017-09-05 | End: 2019-10-21 | Stop reason: SDUPTHER

## 2019-04-17 RX ORDER — FLUTICASONE PROPIONATE 50 MCG
SPRAY, SUSPENSION (ML) NASAL
COMMUNITY
Start: 2017-09-05 | End: 2023-10-16

## 2019-04-17 RX ORDER — ERGOCALCIFEROL 1.25 MG/1
CAPSULE ORAL
COMMUNITY
Start: 2017-09-05 | End: 2022-12-13

## 2019-05-15 ENCOUNTER — OFFICE VISIT (OUTPATIENT)
Dept: NEUROLOGY | Facility: CLINIC | Age: 39
End: 2019-05-15
Payer: COMMERCIAL

## 2019-05-15 VITALS
HEIGHT: 66.5 IN | BODY MASS INDEX: 45.42 KG/M2 | WEIGHT: 286 LBS | HEART RATE: 82 BPM | RESPIRATION RATE: 16 BRPM | SYSTOLIC BLOOD PRESSURE: 136 MMHG | DIASTOLIC BLOOD PRESSURE: 78 MMHG

## 2019-05-15 DIAGNOSIS — G43.109 MIGRAINE EQUIVALENT: ICD-10-CM

## 2019-05-15 DIAGNOSIS — F98.8 ATTENTION DEFICIT DISORDER (ADD) WITHOUT HYPERACTIVITY: ICD-10-CM

## 2019-05-15 DIAGNOSIS — G43.009 MIGRAINE WITHOUT AURA AND WITHOUT STATUS MIGRAINOSUS, NOT INTRACTABLE: Primary | ICD-10-CM

## 2019-05-15 PROCEDURE — 99213 OFFICE O/P EST LOW 20 MIN: CPT | Performed by: OTHER

## 2019-05-15 RX ORDER — DEXTROAMPHETAMINE SACCHARATE, AMPHETAMINE ASPARTATE MONOHYDRATE, DEXTROAMPHETAMINE SULFATE AND AMPHETAMINE SULFATE 3.75; 3.75; 3.75; 3.75 MG/1; MG/1; MG/1; MG/1
15 CAPSULE, EXTENDED RELEASE ORAL EVERY MORNING
Qty: 30 CAPSULE | Refills: 0 | Status: SHIPPED | OUTPATIENT
Start: 2019-05-15 | End: 2019-05-15

## 2019-05-15 RX ORDER — DEXTROAMPHETAMINE SACCHARATE, AMPHETAMINE ASPARTATE MONOHYDRATE, DEXTROAMPHETAMINE SULFATE AND AMPHETAMINE SULFATE 3.75; 3.75; 3.75; 3.75 MG/1; MG/1; MG/1; MG/1
15 CAPSULE, EXTENDED RELEASE ORAL DAILY
Qty: 30 CAPSULE | Refills: 0 | Status: SHIPPED | OUTPATIENT
Start: 2019-06-14 | End: 2019-07-14

## 2019-05-15 RX ORDER — DEXTROAMPHETAMINE SACCHARATE, AMPHETAMINE ASPARTATE MONOHYDRATE, DEXTROAMPHETAMINE SULFATE AND AMPHETAMINE SULFATE 5; 5; 5; 5 MG/1; MG/1; MG/1; MG/1
20 CAPSULE, EXTENDED RELEASE ORAL DAILY
Qty: 30 CAPSULE | Refills: 0 | Status: SHIPPED | OUTPATIENT
Start: 2019-05-27 | End: 2019-05-15

## 2019-05-15 RX ORDER — DEXTROAMPHETAMINE SACCHARATE, AMPHETAMINE ASPARTATE MONOHYDRATE, DEXTROAMPHETAMINE SULFATE AND AMPHETAMINE SULFATE 3.75; 3.75; 3.75; 3.75 MG/1; MG/1; MG/1; MG/1
15 CAPSULE, EXTENDED RELEASE ORAL DAILY
Qty: 30 CAPSULE | Refills: 0 | Status: SHIPPED | OUTPATIENT
Start: 2019-05-15 | End: 2019-06-14

## 2019-05-15 RX ORDER — DEXTROAMPHETAMINE SACCHARATE, AMPHETAMINE ASPARTATE MONOHYDRATE, DEXTROAMPHETAMINE SULFATE AND AMPHETAMINE SULFATE 3.75; 3.75; 3.75; 3.75 MG/1; MG/1; MG/1; MG/1
15 CAPSULE, EXTENDED RELEASE ORAL DAILY
Qty: 30 CAPSULE | Refills: 0 | Status: SHIPPED | OUTPATIENT
Start: 2019-07-14 | End: 2019-08-13

## 2019-05-15 NOTE — PROGRESS NOTES
[facility] with [address]  4/28/1980  Primary Care Provider:  Anisa Patterson MD    5/15/2019  Accompanied visit:      (x) No.        42year old yo patient being seen for:  Secretary Release, Take 40 mg by mouth 2 (two) times daily. , Disp: , Rfl:   •  Spironolactone 50 MG Oral Tab, Take 50 mg by mouth daily. , Disp: , Rfl:   PRN:     Allergies:    Acetaminophen           OTHER (SEE COMMENTS)    Comment:Elevated liver enzymes         EXA side effects of any treatment relevant to above. Includes explanation of tests as necessary. Return in about 6 months (around 11/15/2019).       Patient understands that if needed, based on condition and or test results, follow up will be readjusted

## 2019-07-24 DIAGNOSIS — G43.109 MIGRAINE EQUIVALENT: ICD-10-CM

## 2019-07-25 RX ORDER — RIZATRIPTAN BENZOATE 10 MG/1
TABLET ORAL
Qty: 27 TABLET | Refills: 2 | Status: SHIPPED | OUTPATIENT
Start: 2019-07-25 | End: 2020-03-19

## 2019-07-25 NOTE — TELEPHONE ENCOUNTER
Medication: Riztriptan 10 mg    Date of last refill: 11/27/18 with 5 addt refills  [place]     Last office visit: 5/15/2019  Due back to clinic per last office note:  RTN in 6 months  Date next office visit scheduled:

## 2019-08-21 ENCOUNTER — PATIENT MESSAGE (OUTPATIENT)
Dept: NEUROLOGY | Facility: CLINIC | Age: 39
End: 2019-08-21

## 2019-08-21 ENCOUNTER — TELEPHONE (OUTPATIENT)
Dept: NEUROLOGY | Facility: CLINIC | Age: 39
End: 2019-08-21

## 2019-08-21 DIAGNOSIS — G43.009 MIGRAINE WITHOUT AURA AND WITHOUT STATUS MIGRAINOSUS, NOT INTRACTABLE: Primary | ICD-10-CM

## 2019-08-21 RX ORDER — DEXTROAMPHETAMINE/AMPHETAMINE 15 MG
15 CAPSULE, EXT RELEASE 24 HR ORAL DAILY
Qty: 30 CAPSULE | Refills: 0 | Status: SHIPPED | OUTPATIENT
Start: 2019-09-21 | End: 2019-10-21

## 2019-08-21 RX ORDER — DEXTROAMPHETAMINE/AMPHETAMINE 15 MG
15 CAPSULE, EXT RELEASE 24 HR ORAL DAILY
Qty: 30 CAPSULE | Refills: 0 | Status: SHIPPED | OUTPATIENT
Start: 2019-08-21 | End: 2019-09-20

## 2019-08-21 RX ORDER — DEXTROAMPHETAMINE/AMPHETAMINE 15 MG
15 CAPSULE, EXT RELEASE 24 HR ORAL DAILY
Qty: 30 CAPSULE | Refills: 0 | Status: SHIPPED | OUTPATIENT
Start: 2019-10-22 | End: 2019-11-21

## 2019-08-21 NOTE — TELEPHONE ENCOUNTER
Medication: Adderall 15mg   Date of last refill: 05/15/2019 (#30/3)  [place] 34/87/9684    Last office visit: 5/15/2019  Due back to clinic per last office note:  6 months  Date next office visit scheduled:    Future Miguelangel

## 2019-08-21 NOTE — TELEPHONE ENCOUNTER
----- Message from Jorge Luis Palacios sent at 8/21/2019  9:34 AM CDT -----  Regarding: Prescription Question  Contact: [phone]  Request for refill (3 months) of Adderall 15 MG time release.  (My insurance requires name brand for this medication)     Please

## 2019-08-29 ENCOUNTER — OFFICE VISIT (OUTPATIENT)
Dept: ALLERGY | Age: 39
End: 2019-08-29

## 2019-08-29 ENCOUNTER — LAB SERVICES (OUTPATIENT)
Dept: LAB | Age: 39
End: 2019-08-29

## 2019-08-29 VITALS
BODY MASS INDEX: 44.16 KG/M2 | HEIGHT: 68 IN | TEMPERATURE: 97.1 F | SYSTOLIC BLOOD PRESSURE: 120 MMHG | OXYGEN SATURATION: 100 % | DIASTOLIC BLOOD PRESSURE: 80 MMHG | HEART RATE: 79 BPM | WEIGHT: 291.4 LBS

## 2019-08-29 DIAGNOSIS — L50.1 URTICARIA, IDIOPATHIC: ICD-10-CM

## 2019-08-29 DIAGNOSIS — E89.0 POSTOPERATIVE HYPOTHYROIDISM: ICD-10-CM

## 2019-08-29 DIAGNOSIS — L23.5 ALLERGIC DERMATITIS DUE TO OTHER CHEMICAL PRODUCT: ICD-10-CM

## 2019-08-29 DIAGNOSIS — L50.1 URTICARIA, IDIOPATHIC: Primary | ICD-10-CM

## 2019-08-29 LAB
ALBUMIN SERPL BCG-MCNC: 4.1 G/DL (ref 3.6–5.1)
ALP SERPL-CCNC: 76 U/L (ref 45–130)
ALT SERPL W/O P-5'-P-CCNC: 11 U/L (ref 7–34)
AST SERPL-CCNC: 9 U/L (ref 9–37)
BASOPHIL %: 0.1 % (ref 0–1.2)
BASOPHIL ABSOLUTE #: 0 10*3/UL (ref 0–0.1)
BILIRUB DIRECT SERPL-MCNC: 0.1 MG/DL (ref 0–0.2)
BILIRUB SERPL-MCNC: 0.4 MG/DL (ref 0–1)
C3 SERPL-MCNC: 127 MG/DL (ref 88–165)
C4 SERPL-MCNC: 31.4 MG/DL (ref 14–44)
DIFFERENTIAL TYPE: ABNORMAL
EOSINOPHIL %: 1.3 % (ref 0–10)
EOSINOPHIL ABSOLUTE #: 0.1 10*3/UL (ref 0–0.5)
HEMATOCRIT: 45.4 % (ref 34–45)
HEMOGLOBIN: 14.1 G/DL (ref 11.2–15.7)
LYMPH PERCENT: 25.2 % (ref 20.5–51.1)
LYMPHOCYTE ABSOLUTE #: 2.4 10*3/UL (ref 1.2–3.4)
MEAN CORPUSCULAR HGB CONCENTRATION: 31.1 % (ref 32–36)
MEAN CORPUSCULAR HGB: 26.3 PG (ref 27–34)
MEAN CORPUSCULAR VOLUME: 84.7 FL (ref 79–95)
MEAN PLATELET VOLUME: 10.9 FL (ref 8.6–12.4)
MONOCYTE ABSOLUTE #: 0.8 10*3/UL (ref 0.2–0.9)
MONOCYTE PERCENT: 8.1 % (ref 4.3–12.9)
NEUTROPHIL ABSOLUTE #: 6.3 10*3/UL (ref 1.4–6.5)
NEUTROPHIL PERCENT: 65.3 % (ref 34–73.5)
PLATELET COUNT: 368 10*3/UL (ref 150–400)
PROT SERPL-MCNC: 6.7 G/DL (ref 6.2–8.1)
RED BLOOD CELL COUNT: 5.36 10*6/UL (ref 3.7–5.2)
RED CELL DISTRIBUTION WIDTH: 14.5 % (ref 11.3–14.8)
SEDIMENTATION RATE, RBC: 9 MM/H (ref 0–20)
TSH SERPL DL<=0.05 MIU/L-ACNC: 3.89 M[IU]/L (ref 0.3–4.82)
WHITE BLOOD CELL COUNT: 9.6 10*3/UL (ref 4–10)

## 2019-08-29 PROCEDURE — 86800 THYROGLOBULIN ANTIBODY: CPT | Performed by: NURSE PRACTITIONER

## 2019-08-29 PROCEDURE — 86225 DNA ANTIBODY NATIVE: CPT | Performed by: NURSE PRACTITIONER

## 2019-08-29 PROCEDURE — 99204 OFFICE O/P NEW MOD 45 MIN: CPT | Performed by: NURSE PRACTITIONER

## 2019-08-29 PROCEDURE — 86376 MICROSOMAL ANTIBODY EACH: CPT | Performed by: NURSE PRACTITIONER

## 2019-08-29 PROCEDURE — 95004 PERQ TESTS W/ALRGNC XTRCS: CPT | Performed by: NURSE PRACTITIONER

## 2019-08-29 PROCEDURE — 84443 ASSAY THYROID STIM HORMONE: CPT | Performed by: NURSE PRACTITIONER

## 2019-08-29 PROCEDURE — 85025 COMPLETE CBC W/AUTO DIFF WBC: CPT | Performed by: NURSE PRACTITIONER

## 2019-08-29 PROCEDURE — 86160 COMPLEMENT ANTIGEN: CPT | Performed by: NURSE PRACTITIONER

## 2019-08-29 PROCEDURE — 86038 ANTINUCLEAR ANTIBODIES: CPT | Performed by: NURSE PRACTITIONER

## 2019-08-29 PROCEDURE — 36415 COLL VENOUS BLD VENIPUNCTURE: CPT | Performed by: NURSE PRACTITIONER

## 2019-08-29 PROCEDURE — 85652 RBC SED RATE AUTOMATED: CPT | Performed by: NURSE PRACTITIONER

## 2019-08-29 PROCEDURE — 80076 HEPATIC FUNCTION PANEL: CPT | Performed by: NURSE PRACTITIONER

## 2019-08-29 SDOH — HEALTH STABILITY: MENTAL HEALTH: HOW MANY STANDARD DRINKS CONTAINING ALCOHOL DO YOU HAVE ON A TYPICAL DAY?: 1 OR 2

## 2019-08-29 SDOH — HEALTH STABILITY: MENTAL HEALTH: HOW OFTEN DO YOU HAVE A DRINK CONTAINING ALCOHOL?: 2-4 TIMES A MONTH

## 2019-08-29 ASSESSMENT — ENCOUNTER SYMPTOMS
DIARRHEA: 0
CHEST TIGHTNESS: 0
FEVER: 0
WHEEZING: 0
HEADACHES: 0
FACIAL SWELLING: 0
SORE THROAT: 0
SINUS PAIN: 0
VOMITING: 0
SLEEP DISTURBANCE: 0
ADENOPATHY: 0
COUGH: 0
BACK PAIN: 1
NERVOUS/ANXIOUS: 0
APPETITE CHANGE: 0
ABDOMINAL PAIN: 0

## 2019-08-30 DIAGNOSIS — R07.89 CHEST DISCOMFORT: ICD-10-CM

## 2019-08-30 DIAGNOSIS — I34.0 MITRAL VALVE INSUFFICIENCY, UNSPECIFIED ETIOLOGY: ICD-10-CM

## 2019-08-30 DIAGNOSIS — R00.2 PALPITATIONS: ICD-10-CM

## 2019-08-30 DIAGNOSIS — I34.1 MITRAL VALVE PROLAPSE: Primary | ICD-10-CM

## 2019-08-30 LAB
ANA SER QL IA: NORMAL RATIO (ref 0–0.6)
DSDNA IGG SERPL IA-ACNC: NORMAL [IU]/ML (ref 0–10)

## 2019-08-31 LAB
THYROGLOB AB SERPL-ACNC: 52 IU/ML (ref 0–4)
THYROPEROXIDASE AB SERPL-ACNC: 3748 UNITS/ML

## 2019-09-02 ENCOUNTER — E-ADVICE (OUTPATIENT)
Dept: ALLERGY | Age: 39
End: 2019-09-02

## 2019-09-13 ENCOUNTER — E-ADVICE (OUTPATIENT)
Dept: ALLERGY | Age: 39
End: 2019-09-13

## 2019-09-20 ENCOUNTER — HOSPITAL ENCOUNTER (OUTPATIENT)
Dept: CV DIAGNOSTICS | Facility: HOSPITAL | Age: 39
Discharge: HOME | End: 2019-09-20
Attending: INTERNAL MEDICINE
Payer: COMMERCIAL

## 2019-09-20 DIAGNOSIS — R07.89 CHEST DISCOMFORT: ICD-10-CM

## 2019-09-20 DIAGNOSIS — R00.2 PALPITATIONS: ICD-10-CM

## 2019-09-20 DIAGNOSIS — I34.1 MITRAL VALVE PROLAPSE: ICD-10-CM

## 2019-09-20 DIAGNOSIS — I34.0 MITRAL VALVE INSUFFICIENCY, UNSPECIFIED ETIOLOGY: ICD-10-CM

## 2019-09-20 PROCEDURE — 93306 TTE W/DOPPLER COMPLETE: CPT | Performed by: INTERNAL MEDICINE

## 2019-10-02 ENCOUNTER — OFFICE VISIT (OUTPATIENT)
Dept: ALLERGY | Age: 39
End: 2019-10-02

## 2019-10-02 VITALS
RESPIRATION RATE: 18 BRPM | TEMPERATURE: 97.6 F | DIASTOLIC BLOOD PRESSURE: 84 MMHG | BODY MASS INDEX: 44.34 KG/M2 | OXYGEN SATURATION: 97 % | WEIGHT: 292.6 LBS | HEART RATE: 66 BPM | HEIGHT: 68 IN | SYSTOLIC BLOOD PRESSURE: 120 MMHG

## 2019-10-02 DIAGNOSIS — E89.0 POSTOPERATIVE HYPOTHYROIDISM: ICD-10-CM

## 2019-10-02 DIAGNOSIS — L23.5 ALLERGIC DERMATITIS DUE TO OTHER CHEMICAL PRODUCT: ICD-10-CM

## 2019-10-02 DIAGNOSIS — L50.1 IDIOPATHIC URTICARIA: Primary | ICD-10-CM

## 2019-10-02 PROCEDURE — 99214 OFFICE O/P EST MOD 30 MIN: CPT | Performed by: NURSE PRACTITIONER

## 2019-10-02 RX ORDER — LEVOTHYROXINE SODIUM 125 UG/1
125 CAPSULE ORAL DAILY
COMMUNITY
End: 2022-11-16 | Stop reason: ALTCHOICE

## 2019-10-02 ASSESSMENT — ENCOUNTER SYMPTOMS
ABDOMINAL PAIN: 0
WHEEZING: 0
CHEST TIGHTNESS: 0
ADENOPATHY: 0
DIARRHEA: 0
NERVOUS/ANXIOUS: 0
FACIAL SWELLING: 0
SLEEP DISTURBANCE: 0
VOMITING: 0
APPETITE CHANGE: 0
SORE THROAT: 0
COUGH: 0
FEVER: 0
HEADACHES: 0

## 2019-10-07 ENCOUNTER — OFFICE VISIT (OUTPATIENT)
Dept: CARDIOLOGY | Age: 39
End: 2019-10-07

## 2019-10-07 VITALS
BODY MASS INDEX: 44.41 KG/M2 | HEIGHT: 68 IN | WEIGHT: 293 LBS | HEART RATE: 78 BPM | SYSTOLIC BLOOD PRESSURE: 126 MMHG | DIASTOLIC BLOOD PRESSURE: 86 MMHG

## 2019-10-07 DIAGNOSIS — I34.1 MITRAL VALVE PROLAPSE: ICD-10-CM

## 2019-10-07 DIAGNOSIS — R00.2 PALPITATIONS: Primary | ICD-10-CM

## 2019-10-07 DIAGNOSIS — I34.0 NON-RHEUMATIC MITRAL REGURGITATION: ICD-10-CM

## 2019-10-07 PROCEDURE — 99214 OFFICE O/P EST MOD 30 MIN: CPT | Performed by: INTERNAL MEDICINE

## 2019-10-07 RX ORDER — MELOXICAM 15 MG/1
15 TABLET ORAL PRN
COMMUNITY
Start: 2019-09-16 | End: 2022-01-24 | Stop reason: ALTCHOICE

## 2019-10-07 RX ORDER — DEXTROAMPHETAMINE SACCHARATE, AMPHETAMINE ASPARTATE MONOHYDRATE, DEXTROAMPHETAMINE SULFATE AND AMPHETAMINE SULFATE 3.75; 3.75; 3.75; 3.75 MG/1; MG/1; MG/1; MG/1
15 CAPSULE, EXTENDED RELEASE ORAL
COMMUNITY
Start: 2019-09-21 | End: 2019-10-21

## 2019-10-07 SDOH — HEALTH STABILITY: MENTAL HEALTH: HOW MANY STANDARD DRINKS CONTAINING ALCOHOL DO YOU HAVE ON A TYPICAL DAY?: 1 OR 2

## 2019-10-07 SDOH — HEALTH STABILITY: MENTAL HEALTH: HOW OFTEN DO YOU HAVE A DRINK CONTAINING ALCOHOL?: 2-4 TIMES A MONTH

## 2019-10-07 SDOH — SOCIAL STABILITY: SOCIAL NETWORK: ARE YOU MARRIED, WIDOWED, DIVORCED, SEPARATED, NEVER MARRIED, OR LIVING WITH A PARTNER?: NEVER MARRIED

## 2019-10-07 ASSESSMENT — ENCOUNTER SYMPTOMS
BRUISES/BLEEDS EASILY: 0
COUGH: 0
CHILLS: 0
FEVER: 0
HEMATOCHEZIA: 0
HEMOPTYSIS: 0
WEIGHT LOSS: 0
ALLERGIC/IMMUNOLOGIC COMMENTS: NO NEW FOOD ALLERGIES
WEIGHT GAIN: 0
SUSPICIOUS LESIONS: 0

## 2019-10-07 ASSESSMENT — PATIENT HEALTH QUESTIONNAIRE - PHQ9
1. LITTLE INTEREST OR PLEASURE IN DOING THINGS: NOT AT ALL
2. FEELING DOWN, DEPRESSED OR HOPELESS: NOT AT ALL
SUM OF ALL RESPONSES TO PHQ9 QUESTIONS 1 AND 2: 0
SUM OF ALL RESPONSES TO PHQ9 QUESTIONS 1 AND 2: 0

## 2019-10-21 ENCOUNTER — OFFICE VISIT (OUTPATIENT)
Dept: GASTROENTEROLOGY | Age: 39
End: 2019-10-21

## 2019-10-21 VITALS — WEIGHT: 289 LBS | BODY MASS INDEX: 43.8 KG/M2 | HEIGHT: 68 IN

## 2019-10-21 DIAGNOSIS — Z86.010 PERSONAL HISTORY OF COLONIC POLYPS: ICD-10-CM

## 2019-10-21 DIAGNOSIS — K29.60 EROSIVE GASTRITIS: ICD-10-CM

## 2019-10-21 DIAGNOSIS — K21.9 CHRONIC GERD: Primary | ICD-10-CM

## 2019-10-21 DIAGNOSIS — Z98.84 HISTORY OF LAPAROSCOPIC ADJUSTABLE GASTRIC BANDING: ICD-10-CM

## 2019-10-21 PROCEDURE — 99214 OFFICE O/P EST MOD 30 MIN: CPT | Performed by: INTERNAL MEDICINE

## 2019-10-21 RX ORDER — OMEPRAZOLE 40 MG/1
40 CAPSULE, DELAYED RELEASE ORAL 2 TIMES DAILY
Qty: 180 CAPSULE | Refills: 3 | Status: SHIPPED | OUTPATIENT
Start: 2019-10-21 | End: 2020-11-16 | Stop reason: SDUPTHER

## 2019-12-15 ENCOUNTER — E-ADVICE (OUTPATIENT)
Dept: ALLERGY | Age: 39
End: 2019-12-15

## 2019-12-19 ENCOUNTER — OFFICE VISIT (OUTPATIENT)
Dept: NEUROLOGY | Facility: CLINIC | Age: 39
End: 2019-12-19
Payer: COMMERCIAL

## 2019-12-19 VITALS
HEART RATE: 74 BPM | DIASTOLIC BLOOD PRESSURE: 78 MMHG | BODY MASS INDEX: 45.1 KG/M2 | WEIGHT: 284 LBS | RESPIRATION RATE: 16 BRPM | SYSTOLIC BLOOD PRESSURE: 122 MMHG | HEIGHT: 66.5 IN

## 2019-12-19 DIAGNOSIS — R41.3 MEMORY DIFFICULTIES: ICD-10-CM

## 2019-12-19 DIAGNOSIS — G43.009 MIGRAINE WITHOUT AURA AND WITHOUT STATUS MIGRAINOSUS, NOT INTRACTABLE: Primary | ICD-10-CM

## 2019-12-19 DIAGNOSIS — F90.0 ATTENTION DEFICIT HYPERACTIVITY DISORDER (ADHD), PREDOMINANTLY INATTENTIVE TYPE: ICD-10-CM

## 2019-12-19 DIAGNOSIS — Z86.39 HX OF IRON DEFICIENCY: ICD-10-CM

## 2019-12-19 PROCEDURE — 99214 OFFICE O/P EST MOD 30 MIN: CPT | Performed by: PHYSICIAN ASSISTANT

## 2019-12-19 RX ORDER — DEXTROAMPHETAMINE SACCHARATE, AMPHETAMINE ASPARTATE MONOHYDRATE, DEXTROAMPHETAMINE SULFATE AND AMPHETAMINE SULFATE 3.75; 3.75; 3.75; 3.75 MG/1; MG/1; MG/1; MG/1
15 CAPSULE, EXTENDED RELEASE ORAL EVERY MORNING
COMMUNITY
End: 2019-12-19 | Stop reason: DRUGHIGH

## 2019-12-19 RX ORDER — TOPIRAMATE 25 MG/1
TABLET ORAL
Qty: 21 TABLET | Refills: 0 | Status: SHIPPED | OUTPATIENT
Start: 2019-12-19 | End: 2020-03-19

## 2019-12-19 RX ORDER — DEXTROAMPHETAMINE SACCHARATE, AMPHETAMINE ASPARTATE MONOHYDRATE, DEXTROAMPHETAMINE SULFATE AND AMPHETAMINE SULFATE 2.5; 2.5; 2.5; 2.5 MG/1; MG/1; MG/1; MG/1
10 CAPSULE, EXTENDED RELEASE ORAL EVERY MORNING
Qty: 30 CAPSULE | Refills: 0 | Status: SHIPPED | OUTPATIENT
Start: 2019-12-19 | End: 2020-01-14

## 2019-12-19 RX ORDER — LEVOTHYROXINE SODIUM 112 UG/1
112 TABLET ORAL
COMMUNITY
End: 2021-11-22

## 2019-12-19 NOTE — PROGRESS NOTES
[facility] with [address]  4/28/1980  Primary Care Provider:  Nyasia Santillan MD    12/19/2019  Accompanied visit: Yes- mother      42year old female patient being seen for: Migr 2 tabs po qhs x 1 week then 1 tab po qhs x 1 week then stop, Disp: 21 tablet, Rfl: 0  •  RIZATRIPTAN BENZOATE 10 MG Oral Tab, TAKE 1 TAB BY MOUTH AS NEEDED AT ONSET OF MIGRAINE. MAY REPEAT ONCE AFTER 2HRS MAX 2 TABS PER DAY., Disp: 27 tablet, Rfl: 2  •  FL Will start her on aimovig for headache prevention. Memory Difficulties discussed with patient that could be combination of side effect to topamax, add and added work stress. Will get her off the topamax and see if memory improves.  Patient given order to

## 2019-12-27 ENCOUNTER — PATIENT MESSAGE (OUTPATIENT)
Dept: NEUROLOGY | Facility: CLINIC | Age: 39
End: 2019-12-27

## 2019-12-27 NOTE — TELEPHONE ENCOUNTER
Per [email or web address] message, patient states there is not an MRI order placed. Per Epic review, MRI has not been sent for PA d/t needing MRI IF memory does not improve after weaning off Topamax.     Spoke with PA team and they are waiting to PA until patient upda

## 2019-12-30 RX ORDER — TOPIRAMATE 25 MG/1
TABLET ORAL
Qty: 21 TABLET | Refills: 0 | OUTPATIENT
Start: 2019-12-30

## 2019-12-31 ENCOUNTER — TELEPHONE (OUTPATIENT)
Dept: NEUROLOGY | Facility: CLINIC | Age: 39
End: 2019-12-31

## 2019-12-31 NOTE — TELEPHONE ENCOUNTER
Lab results for the following tests received for provider review:    Ferritin  Folate  Vitamin B12  Iron  RPR Screen/reflex titer    Placed on provider desk for review, then to scanning.

## 2020-01-02 ENCOUNTER — TELEPHONE (OUTPATIENT)
Dept: NEUROLOGY | Facility: CLINIC | Age: 40
End: 2020-01-02

## 2020-01-02 NOTE — TELEPHONE ENCOUNTER
Patient informed that folic acid, Vitamin J01 and RPR were normal. Her iron was low. Recommended she follow-up with pcp. She informs me that she previously followed with hematologist for iron deficiency so recommended she follow-up with them.  She expressed

## 2020-01-08 ENCOUNTER — TELEPHONE (OUTPATIENT)
Dept: NEUROLOGY | Facility: CLINIC | Age: 40
End: 2020-01-08

## 2020-01-08 NOTE — TELEPHONE ENCOUNTER
Patient had previously been recommended to discontinue Topamax. She is looking for lab order for c-diff but will contact PCP.

## 2020-01-13 ENCOUNTER — HOSPITAL ENCOUNTER (OUTPATIENT)
Dept: MRI IMAGING | Age: 40
Discharge: HOME | End: 2020-01-13
Attending: PHYSICIAN ASSISTANT
Payer: COMMERCIAL

## 2020-01-13 DIAGNOSIS — R41.3 MEMORY DIFFICULTIES: ICD-10-CM

## 2020-01-13 DIAGNOSIS — G43.009 MIGRAINE WITHOUT AURA AND WITHOUT STATUS MIGRAINOSUS, NOT INTRACTABLE: ICD-10-CM

## 2020-01-13 PROCEDURE — A9575 INJ GADOTERATE MEGLUMI 0.1ML: HCPCS | Performed by: PHYSICIAN ASSISTANT

## 2020-01-13 PROCEDURE — 70553 MRI BRAIN STEM W/O & W/DYE: CPT | Performed by: PHYSICIAN ASSISTANT

## 2020-01-14 NOTE — TELEPHONE ENCOUNTER
From: Cecilio Banister  To: BILLIE Mcgrath  Sent: 1/14/2020 3:24 PM CST  Subject: Referral Request    Request for refill of adderall 10mg time release to be sent to [facility] in [facility].      [employer], [place] [address], [facility], [address]

## 2020-01-14 NOTE — TELEPHONE ENCOUNTER
Medication: Adderall    Date of last refill: 12/19/2019 (#30/0)  [place] Verified fill 12/19/19 for #30    Last office visit: 12/19/2019  Due back to clinic per last office note:  RTC 3 months  Date next office visit ivana

## 2020-01-22 ENCOUNTER — E-ADVICE (OUTPATIENT)
Dept: GASTROENTEROLOGY | Age: 40
End: 2020-01-22

## 2020-01-29 NOTE — TELEPHONE ENCOUNTER
Medication: Aimovig 70 mg     Date of last refill: 12/19/2019 with 3 addt refills       Last office visit: 12/19/2019  Due back to clinic per last office note:  RTC 3 months  Date next office visit scheduled:           Future Appointments   Date Time Brandy

## 2020-02-13 ENCOUNTER — PATIENT MESSAGE (OUTPATIENT)
Dept: NEUROLOGY | Facility: CLINIC | Age: 40
End: 2020-02-13

## 2020-02-13 DIAGNOSIS — F90.0 ATTENTION DEFICIT HYPERACTIVITY DISORDER (ADHD), PREDOMINANTLY INATTENTIVE TYPE: ICD-10-CM

## 2020-02-14 RX ORDER — DEXTROAMPHETAMINE SACCHARATE, AMPHETAMINE ASPARTATE MONOHYDRATE, DEXTROAMPHETAMINE SULFATE AND AMPHETAMINE SULFATE 2.5; 2.5; 2.5; 2.5 MG/1; MG/1; MG/1; MG/1
10 CAPSULE, EXTENDED RELEASE ORAL EVERY MORNING
Qty: 30 CAPSULE | Refills: 0 | Status: SHIPPED | OUTPATIENT
Start: 2020-02-14 | End: 2020-03-19

## 2020-02-14 NOTE — TELEPHONE ENCOUNTER
Medication: Adderall ER 10mg    Date of last refill: 1/14/2020 (#30/0)  [place] 01/60/1492    Last office visit: 12/19/2019  Due back to clinic per last office note:  11/15/2019  Date next office visit scheduled:     Lucho

## 2020-02-14 NOTE — TELEPHONE ENCOUNTER
From: Cy Lunch  To: BILLIE Isidro  Sent: 2/13/2020 9:12 PM CST  Subject: Prescription Question    Request for refill of adderall 10mg time release to be sent to [facility] in [place]. Kalani Neal, [address] [place] [address].   [phone]

## 2020-03-19 ENCOUNTER — OFFICE VISIT (OUTPATIENT)
Dept: NEUROLOGY | Facility: CLINIC | Age: 40
End: 2020-03-19
Payer: COMMERCIAL

## 2020-03-19 VITALS
SYSTOLIC BLOOD PRESSURE: 128 MMHG | WEIGHT: 284 LBS | HEART RATE: 83 BPM | DIASTOLIC BLOOD PRESSURE: 88 MMHG | HEIGHT: 66 IN | BODY MASS INDEX: 45.64 KG/M2 | RESPIRATION RATE: 16 BRPM

## 2020-03-19 DIAGNOSIS — G43.109 MIGRAINE WITH AURA AND WITHOUT STATUS MIGRAINOSUS, NOT INTRACTABLE: Primary | ICD-10-CM

## 2020-03-19 DIAGNOSIS — F90.0 ATTENTION DEFICIT HYPERACTIVITY DISORDER (ADHD), PREDOMINANTLY INATTENTIVE TYPE: ICD-10-CM

## 2020-03-19 DIAGNOSIS — R41.3 MEMORY DIFFICULTIES: ICD-10-CM

## 2020-03-19 DIAGNOSIS — M25.512 ACUTE PAIN OF LEFT SHOULDER: ICD-10-CM

## 2020-03-19 PROCEDURE — 99214 OFFICE O/P EST MOD 30 MIN: CPT | Performed by: PHYSICIAN ASSISTANT

## 2020-03-19 RX ORDER — DEXTROAMPHETAMINE SACCHARATE, AMPHETAMINE ASPARTATE MONOHYDRATE, DEXTROAMPHETAMINE SULFATE AND AMPHETAMINE SULFATE 2.5; 2.5; 2.5; 2.5 MG/1; MG/1; MG/1; MG/1
10 CAPSULE, EXTENDED RELEASE ORAL EVERY MORNING
Qty: 30 CAPSULE | Refills: 0 | Status: SHIPPED | OUTPATIENT
Start: 2020-03-19 | End: 2020-05-13

## 2020-03-19 RX ORDER — RIZATRIPTAN BENZOATE 10 MG/1
TABLET ORAL
Qty: 27 TABLET | Refills: 1 | Status: SHIPPED | OUTPATIENT
Start: 2020-03-19 | End: 2020-05-11

## 2020-03-19 RX ORDER — DEXTROAMPHETAMINE SACCHARATE, AMPHETAMINE ASPARTATE MONOHYDRATE, DEXTROAMPHETAMINE SULFATE AND AMPHETAMINE SULFATE 2.5; 2.5; 2.5; 2.5 MG/1; MG/1; MG/1; MG/1
10 CAPSULE, EXTENDED RELEASE ORAL EVERY MORNING
Qty: 30 CAPSULE | Refills: 0 | Status: SHIPPED | OUTPATIENT
Start: 2020-03-19 | End: 2020-03-19 | Stop reason: CLARIF

## 2020-03-19 NOTE — PROGRESS NOTES
[facility] with [address]  4/28/1980  Primary Care Provider:  Jimmy Macedo MD    3/19/2020  Accompanied visit: No      42year old female patient being seen for: Yudelka 0  •  Rizatriptan Benzoate 10 MG Oral Tab, TAKE 1 TAB BY MOUTH AS NEEDED AT ONSET OF MIGRAINE. MAY REPEAT ONCE AFTER 2HRS MAX 2 TABS PER DAY., Disp: 27 tablet, Rfl: 1  •  erenumab-aooe (AIMOVIG) 70 MG/ML SC, Inject 1 mL (70 mg total) into the skin every 30 tx    Memory Difficulties- improved after discontinuation of the topamax    ADD- currently well controlled. Continue the adderall XR 10mg daily    Acute left shoulder pain- has order for PT. Proceed with PT. If no improvement follow-up.       (X) Discussed

## 2020-03-19 NOTE — TELEPHONE ENCOUNTER
Pt came back to  after office visit and wanted the Adderall to be name brand and not generic since her insurance only covers name brand. Pt wanted to make sure sent to pharmacy for adderall.

## 2020-05-11 ENCOUNTER — TELEPHONE (OUTPATIENT)
Dept: NEUROLOGY | Facility: CLINIC | Age: 40
End: 2020-05-11

## 2020-05-11 RX ORDER — RIZATRIPTAN BENZOATE 10 MG/1
TABLET ORAL
Qty: 27 TABLET | Refills: 0 | Status: SHIPPED | OUTPATIENT
Start: 2020-05-11 | End: 2020-12-30

## 2020-05-11 NOTE — TELEPHONE ENCOUNTER
90 day supply requested by mail-order pharmacy; previous script was for 90 day supply to local pharmacy. Script forwarded to [employer].

## 2020-05-13 DIAGNOSIS — F90.0 ATTENTION DEFICIT HYPERACTIVITY DISORDER (ADHD), PREDOMINANTLY INATTENTIVE TYPE: ICD-10-CM

## 2020-05-13 RX ORDER — DEXTROAMPHETAMINE SACCHARATE, AMPHETAMINE ASPARTATE MONOHYDRATE, DEXTROAMPHETAMINE SULFATE AND AMPHETAMINE SULFATE 2.5; 2.5; 2.5; 2.5 MG/1; MG/1; MG/1; MG/1
10 CAPSULE, EXTENDED RELEASE ORAL EVERY MORNING
Qty: 30 CAPSULE | Refills: 0 | Status: SHIPPED | OUTPATIENT
Start: 2020-05-13 | End: 2020-06-25

## 2020-05-13 NOTE — TELEPHONE ENCOUNTER
Medication: Amphetamine-Dextroamphet ER (ADDERALL XR) 10 MG Oral Capsule SR 24 Hr    Date of last refill: 3/19/2020 (#30/0)  [place] 8/61/8571    Last office visit: 3/19/2020  Due back to clinic per last office note:  6

## 2020-06-25 DIAGNOSIS — F90.0 ATTENTION DEFICIT HYPERACTIVITY DISORDER (ADHD), PREDOMINANTLY INATTENTIVE TYPE: ICD-10-CM

## 2020-06-26 ENCOUNTER — PATIENT MESSAGE (OUTPATIENT)
Dept: NEUROLOGY | Facility: CLINIC | Age: 40
End: 2020-06-26

## 2020-06-26 DIAGNOSIS — G43.109 MIGRAINE WITH AURA AND WITHOUT STATUS MIGRAINOSUS, NOT INTRACTABLE: Primary | ICD-10-CM

## 2020-06-26 RX ORDER — DEXTROAMPHETAMINE SACCHARATE, AMPHETAMINE ASPARTATE MONOHYDRATE, DEXTROAMPHETAMINE SULFATE AND AMPHETAMINE SULFATE 2.5; 2.5; 2.5; 2.5 MG/1; MG/1; MG/1; MG/1
10 CAPSULE, EXTENDED RELEASE ORAL EVERY MORNING
Qty: 30 CAPSULE | Refills: 0 | Status: SHIPPED | OUTPATIENT
Start: 2020-06-26 | End: 2020-08-12

## 2020-06-26 NOTE — TELEPHONE ENCOUNTER
Medication: Amphetamine-Dextroamphet ER (ADDERALL XR) 10 MG Oral Capsule SR 24 Hr     Date of last refill: 5/13/20 (#30/0)  [place] 6/75/55 (#83)    Last office visit: 3/19/20 with Lakeshia Ford  Due back to clinic per last offi

## 2020-06-29 NOTE — TELEPHONE ENCOUNTER
Medication: Aimovig 70mg/mL    Date of last refill: 1/29/20 (#3 pens/1)  [place]    Last office visit: 3/19/2020  Due back to clinic per last office note:  6 months  Date next office visit scheduled:  No future appoi

## 2020-06-29 NOTE — TELEPHONE ENCOUNTER
From: Cele Andersen  To: BILLIE Kumari  Sent: 6/26/2020 10:53 PM CDT  Subject: Prescription Question    Refill request  -this Rx wasn't available to request a refill.      Aimovig 70mg sent to [facility] 90 day supply     Please let me know if any

## 2020-08-12 ENCOUNTER — TELEPHONE (OUTPATIENT)
Dept: NEUROLOGY | Facility: CLINIC | Age: 40
End: 2020-08-12

## 2020-08-12 DIAGNOSIS — F90.0 ATTENTION DEFICIT HYPERACTIVITY DISORDER (ADHD), PREDOMINANTLY INATTENTIVE TYPE: ICD-10-CM

## 2020-08-12 RX ORDER — DEXTROAMPHETAMINE SACCHARATE, AMPHETAMINE ASPARTATE MONOHYDRATE, DEXTROAMPHETAMINE SULFATE AND AMPHETAMINE SULFATE 2.5; 2.5; 2.5; 2.5 MG/1; MG/1; MG/1; MG/1
10 CAPSULE, EXTENDED RELEASE ORAL EVERY MORNING
Qty: 30 CAPSULE | Refills: 0 | Status: SHIPPED | OUTPATIENT
Start: 2020-08-12 | End: 2020-08-13

## 2020-08-12 NOTE — TELEPHONE ENCOUNTER
Refill request      (ADDERALL XR) 10 MG Oral Capsule SR 24 Hr    To be sent to [facility] in [address]          Medication: Amphetamine-Dextroamphet ER (ADDERALL XR) 10 MG Oral Capsule SR 24 Hr    Date of last refill: 6/26/2020#30/0)  Date last filled p

## 2020-08-12 NOTE — TELEPHONE ENCOUNTER
Per mom, please resend generic adderall refill and alex the rx as \"may substitute\". The insurance plan will default to name brand and it will cost the patient less. When we send it as name brand only, the cost is higher.   Mom realizes this does not radha

## 2020-08-13 DIAGNOSIS — F90.0 ATTENTION DEFICIT HYPERACTIVITY DISORDER (ADHD), PREDOMINANTLY INATTENTIVE TYPE: ICD-10-CM

## 2020-08-13 RX ORDER — DEXTROAMPHETAMINE SACCHARATE, AMPHETAMINE ASPARTATE MONOHYDRATE, DEXTROAMPHETAMINE SULFATE AND AMPHETAMINE SULFATE 2.5; 2.5; 2.5; 2.5 MG/1; MG/1; MG/1; MG/1
10 CAPSULE, EXTENDED RELEASE ORAL EVERY MORNING
Qty: 30 CAPSULE | Refills: 0 | Status: SHIPPED | OUTPATIENT
Start: 2020-08-13 | End: 2020-09-17

## 2020-08-13 NOTE — TELEPHONE ENCOUNTER
Concha Lunch      8/12/20 3:30 PM   Note      Per mom, please resend generic adderall refill and alex the rx as \"may substitute\". The insurance plan will default to name brand and it will cost the patient less.   When we send it as name brand only, t

## 2020-09-16 ENCOUNTER — PATIENT MESSAGE (OUTPATIENT)
Dept: NEUROLOGY | Facility: CLINIC | Age: 40
End: 2020-09-16

## 2020-09-16 DIAGNOSIS — F90.0 ATTENTION DEFICIT HYPERACTIVITY DISORDER (ADHD), PREDOMINANTLY INATTENTIVE TYPE: ICD-10-CM

## 2020-09-17 RX ORDER — DEXTROAMPHETAMINE SACCHARATE, AMPHETAMINE ASPARTATE MONOHYDRATE, DEXTROAMPHETAMINE SULFATE AND AMPHETAMINE SULFATE 2.5; 2.5; 2.5; 2.5 MG/1; MG/1; MG/1; MG/1
10 CAPSULE, EXTENDED RELEASE ORAL EVERY MORNING
Qty: 30 CAPSULE | Refills: 0 | Status: SHIPPED | OUTPATIENT
Start: 2020-09-17 | End: 2020-11-11

## 2020-09-17 NOTE — TELEPHONE ENCOUNTER
From: Suzanne Barth  To: BILLIE Duffy  Sent: 9/16/2020 10:21 PM CDT  Subject: Prescription Question    Can you please send a refill for Adderall 10XR.    My insurance only pays for name brand BUT I've been told the script should be for named brand but

## 2020-09-17 NOTE — TELEPHONE ENCOUNTER
Medication: Adderall XR 10 mg    Date of last refill: 8/13/2020 (#30/0)  [place]     Last office visit: 3/19/2020  Due back to clinic per last office note:  RTC in 6 months  Date next office visit scheduled:  No future a

## 2020-09-21 ENCOUNTER — TELEPHONE (OUTPATIENT)
Dept: GASTROENTEROLOGY | Age: 40
End: 2020-09-21

## 2020-09-21 ENCOUNTER — APPOINTMENT (OUTPATIENT)
Dept: GASTROENTEROLOGY | Age: 40
End: 2020-09-21

## 2020-10-09 ENCOUNTER — OFFICE VISIT (OUTPATIENT)
Dept: CARDIOLOGY | Age: 40
End: 2020-10-09

## 2020-10-09 ENCOUNTER — APPOINTMENT (OUTPATIENT)
Dept: CARDIOLOGY | Age: 40
End: 2020-10-09

## 2020-10-09 VITALS
WEIGHT: 293 LBS | BODY MASS INDEX: 44.41 KG/M2 | HEART RATE: 74 BPM | SYSTOLIC BLOOD PRESSURE: 126 MMHG | HEIGHT: 68 IN | DIASTOLIC BLOOD PRESSURE: 84 MMHG

## 2020-10-09 DIAGNOSIS — I34.1 MITRAL VALVE PROLAPSE: Primary | ICD-10-CM

## 2020-10-09 DIAGNOSIS — R00.2 PALPITATIONS: ICD-10-CM

## 2020-10-09 DIAGNOSIS — I34.0 NON-RHEUMATIC MITRAL REGURGITATION: ICD-10-CM

## 2020-10-09 PROCEDURE — 99214 OFFICE O/P EST MOD 30 MIN: CPT | Performed by: INTERNAL MEDICINE

## 2020-10-09 RX ORDER — DEXTROAMPHETAMINE SACCHARATE, AMPHETAMINE ASPARTATE MONOHYDRATE, DEXTROAMPHETAMINE SULFATE AND AMPHETAMINE SULFATE 2.5; 2.5; 2.5; 2.5 MG/1; MG/1; MG/1; MG/1
15 CAPSULE, EXTENDED RELEASE ORAL DAILY
COMMUNITY
Start: 2020-06-26 | End: 2023-06-19 | Stop reason: ALTCHOICE

## 2020-10-09 RX ORDER — METFORMIN HYDROCHLORIDE 500 MG/1
TABLET, EXTENDED RELEASE ORAL
COMMUNITY
Start: 2020-10-05 | End: 2023-06-19 | Stop reason: ALTCHOICE

## 2020-10-09 RX ORDER — CYANOCOBALAMIN 1000 UG/ML
INJECTION, SOLUTION INTRAMUSCULAR; SUBCUTANEOUS
COMMUNITY
Start: 2020-09-04

## 2020-10-09 RX ORDER — BUPROPION HYDROCHLORIDE 150 MG/1
TABLET ORAL
COMMUNITY
Start: 2020-10-06 | End: 2022-01-24 | Stop reason: ALTCHOICE

## 2020-10-09 RX ORDER — NALTREXONE HYDROCHLORIDE 50 MG/1
TABLET, FILM COATED ORAL
COMMUNITY
Start: 2020-10-06 | End: 2022-01-24 | Stop reason: ALTCHOICE

## 2020-10-09 RX ORDER — ERENUMAB-AOOE 70 MG/ML
INJECTION SUBCUTANEOUS
COMMUNITY
Start: 2020-07-16 | End: 2023-06-19 | Stop reason: ALTCHOICE

## 2020-10-09 SDOH — HEALTH STABILITY: MENTAL HEALTH: HOW MANY STANDARD DRINKS CONTAINING ALCOHOL DO YOU HAVE ON A TYPICAL DAY?: 1 OR 2

## 2020-10-09 SDOH — HEALTH STABILITY: MENTAL HEALTH: HOW OFTEN DO YOU HAVE A DRINK CONTAINING ALCOHOL?: 2-4 TIMES A MONTH

## 2020-10-09 SDOH — HEALTH STABILITY: PHYSICAL HEALTH: ON AVERAGE, HOW MANY DAYS PER WEEK DO YOU ENGAGE IN MODERATE TO STRENUOUS EXERCISE (LIKE A BRISK WALK)?: 7 DAYS

## 2020-10-09 SDOH — HEALTH STABILITY: PHYSICAL HEALTH: ON AVERAGE, HOW MANY MINUTES DO YOU ENGAGE IN EXERCISE AT THIS LEVEL?: 60 MIN

## 2020-10-09 ASSESSMENT — PATIENT HEALTH QUESTIONNAIRE - PHQ9
CLINICAL INTERPRETATION OF PHQ9 SCORE: NO FURTHER SCREENING NEEDED
2. FEELING DOWN, DEPRESSED OR HOPELESS: NOT AT ALL
CLINICAL INTERPRETATION OF PHQ2 SCORE: NO FURTHER SCREENING NEEDED
SUM OF ALL RESPONSES TO PHQ9 QUESTIONS 1 AND 2: 0
1. LITTLE INTEREST OR PLEASURE IN DOING THINGS: NOT AT ALL
SUM OF ALL RESPONSES TO PHQ9 QUESTIONS 1 AND 2: 0

## 2020-10-09 ASSESSMENT — ENCOUNTER SYMPTOMS
ALLERGIC/IMMUNOLOGIC COMMENTS: NO NEW FOOD ALLERGIES
HEMATOCHEZIA: 0
WEIGHT GAIN: 0
HEMOPTYSIS: 0
SUSPICIOUS LESIONS: 0
FEVER: 0
BRUISES/BLEEDS EASILY: 0
CHILLS: 0
WEIGHT LOSS: 0
COUGH: 0

## 2020-11-11 ENCOUNTER — PATIENT MESSAGE (OUTPATIENT)
Dept: NEUROLOGY | Facility: CLINIC | Age: 40
End: 2020-11-11

## 2020-11-11 DIAGNOSIS — F90.0 ATTENTION DEFICIT HYPERACTIVITY DISORDER (ADHD), PREDOMINANTLY INATTENTIVE TYPE: ICD-10-CM

## 2020-11-11 RX ORDER — DEXTROAMPHETAMINE SACCHARATE, AMPHETAMINE ASPARTATE MONOHYDRATE, DEXTROAMPHETAMINE SULFATE AND AMPHETAMINE SULFATE 2.5; 2.5; 2.5; 2.5 MG/1; MG/1; MG/1; MG/1
10 CAPSULE, EXTENDED RELEASE ORAL EVERY MORNING
Qty: 30 CAPSULE | Refills: 0 | Status: SHIPPED | OUTPATIENT
Start: 2020-11-11 | End: 2020-12-08

## 2020-11-11 NOTE — TELEPHONE ENCOUNTER
From: Juan Antonio Emerson  To: BILLIE Noble  Sent: 11/11/2020 12:44 PM CST  Subject: Prescription Question    Can you please send a refill for Adderall 10XR.    My insurance only pays for name brand BUT I've been told the script should be for named brand wit

## 2020-11-11 NOTE — TELEPHONE ENCOUNTER
Medication: Adderall    Date of last refill: 9/17/2020 (#30/0)  [place]    Last office visit: 3/19/2020  Due back to clinic per last office note:  RTC in 6 months  Date next office visit scheduled:  No future ap

## 2020-11-16 ENCOUNTER — OFFICE VISIT (OUTPATIENT)
Dept: GASTROENTEROLOGY | Age: 40
End: 2020-11-16

## 2020-11-16 VITALS — BODY MASS INDEX: 44.41 KG/M2 | WEIGHT: 293 LBS | HEIGHT: 68 IN

## 2020-11-16 DIAGNOSIS — K21.9 CHRONIC GERD: Primary | ICD-10-CM

## 2020-11-16 DIAGNOSIS — Z01.818 PRE-OP TESTING: ICD-10-CM

## 2020-11-16 DIAGNOSIS — K29.60 EROSIVE GASTRITIS: ICD-10-CM

## 2020-11-16 DIAGNOSIS — Z86.010 PERSONAL HISTORY OF COLONIC POLYPS: ICD-10-CM

## 2020-11-16 DIAGNOSIS — K62.5 RECTAL BLEEDING: ICD-10-CM

## 2020-11-16 PROCEDURE — 99215 OFFICE O/P EST HI 40 MIN: CPT | Performed by: INTERNAL MEDICINE

## 2020-11-16 RX ORDER — OMEPRAZOLE 40 MG/1
40 CAPSULE, DELAYED RELEASE ORAL DAILY
Qty: 90 CAPSULE | Refills: 3 | Status: SHIPPED | OUTPATIENT
Start: 2020-11-16 | End: 2022-01-24 | Stop reason: SDUPTHER

## 2020-11-16 RX ORDER — SODIUM, POTASSIUM,MAG SULFATES 17.5-3.13G
SOLUTION, RECONSTITUTED, ORAL ORAL
Qty: 1 KIT | Refills: 0 | Status: SHIPPED | OUTPATIENT
Start: 2020-11-16 | End: 2020-12-31

## 2020-11-16 RX ORDER — SIMETHICONE 125 MG
TABLET,CHEWABLE ORAL
Qty: 2 TABLET | Refills: 0 | Status: SHIPPED | OUTPATIENT
Start: 2020-11-16 | End: 2020-12-31

## 2020-11-16 RX ORDER — BISACODYL 5 MG/1
TABLET, DELAYED RELEASE ORAL
Qty: 2 TABLET | Refills: 0 | Status: SHIPPED | OUTPATIENT
Start: 2020-11-16 | End: 2020-12-31

## 2020-12-08 DIAGNOSIS — F90.0 ATTENTION DEFICIT HYPERACTIVITY DISORDER (ADHD), PREDOMINANTLY INATTENTIVE TYPE: ICD-10-CM

## 2020-12-09 RX ORDER — DEXTROAMPHETAMINE SACCHARATE, AMPHETAMINE ASPARTATE MONOHYDRATE, DEXTROAMPHETAMINE SULFATE AND AMPHETAMINE SULFATE 2.5; 2.5; 2.5; 2.5 MG/1; MG/1; MG/1; MG/1
10 CAPSULE, EXTENDED RELEASE ORAL EVERY MORNING
Qty: 30 CAPSULE | Refills: 0 | Status: SHIPPED | OUTPATIENT
Start: 2020-12-09 | End: 2021-02-18

## 2020-12-09 NOTE — TELEPHONE ENCOUNTER
Medication: Adderall 10mg     Date of last refill: 11/11/20  [place] 55/90/78     Last office visit: 3/19/2020  Due back to clinic per last office note:  RTC in 6 months  Date next office visit scheduled:  No future appo

## 2020-12-16 ENCOUNTER — TELEPHONE (OUTPATIENT)
Dept: GASTROENTEROLOGY | Age: 40
End: 2020-12-16

## 2020-12-21 ENCOUNTER — LAB SERVICES (OUTPATIENT)
Dept: LAB | Age: 40
End: 2020-12-21

## 2020-12-21 DIAGNOSIS — Z01.818 PRE-OP TESTING: ICD-10-CM

## 2020-12-21 LAB
SARS-COV-2 RNA RESP QL NAA+PROBE: NOT DETECTED
SERVICE CMNT-IMP: NORMAL
SERVICE CMNT-IMP: NORMAL

## 2020-12-21 PROCEDURE — U0003 INFECTIOUS AGENT DETECTION BY NUCLEIC ACID (DNA OR RNA); SEVERE ACUTE RESPIRATORY SYNDROME CORONAVIRUS 2 (SARS-COV-2) (CORONAVIRUS DISEASE [COVID-19]), AMPLIFIED PROBE TECHNIQUE, MAKING USE OF HIGH THROUGHPUT TECHNOLOGIES AS DESCRIBED BY CMS-2020-01-R: HCPCS | Performed by: INTERNAL MEDICINE

## 2020-12-23 ENCOUNTER — OFFICE VISIT (OUTPATIENT)
Dept: GASTROENTEROLOGY | Age: 40
End: 2020-12-23
Attending: INTERNAL MEDICINE

## 2020-12-23 DIAGNOSIS — K92.1 HEMATOCHEZIA: ICD-10-CM

## 2020-12-23 DIAGNOSIS — K62.5 HEMORRHAGE OF ANUS AND RECTUM: ICD-10-CM

## 2020-12-23 DIAGNOSIS — K21.9 CHRONIC GERD: Primary | ICD-10-CM

## 2020-12-23 DIAGNOSIS — K21.9 GASTRO-ESOPHAGEAL REFLUX DISEASE WITHOUT ESOPHAGITIS: ICD-10-CM

## 2020-12-23 DIAGNOSIS — D13.1 BENIGN NEOPLASM OF STOMACH: ICD-10-CM

## 2020-12-23 DIAGNOSIS — Z86.010 PERSONAL HISTORY OF COLONIC POLYPS: ICD-10-CM

## 2020-12-23 DIAGNOSIS — D12.8 BENIGN NEOPLASM OF RECTUM: ICD-10-CM

## 2020-12-23 LAB
COLONOSCOPY STUDY: NORMAL
PREGNANCY TEST, URINE: NEGATIVE

## 2020-12-23 PROCEDURE — 45385 COLONOSCOPY W/LESION REMOVAL: CPT | Performed by: INTERNAL MEDICINE

## 2020-12-23 PROCEDURE — 88305 TISSUE EXAM BY PATHOLOGIST: CPT | Performed by: PATHOLOGY

## 2020-12-23 PROCEDURE — 45380 COLONOSCOPY AND BIOPSY: CPT | Performed by: INTERNAL MEDICINE

## 2020-12-23 PROCEDURE — 43239 EGD BIOPSY SINGLE/MULTIPLE: CPT | Performed by: INTERNAL MEDICINE

## 2020-12-23 PROCEDURE — 43251 EGD REMOVE LESION SNARE: CPT | Performed by: INTERNAL MEDICINE

## 2020-12-24 ENCOUNTER — LAB REQUISITION (OUTPATIENT)
Dept: LAB | Age: 40
End: 2020-12-24

## 2020-12-24 DIAGNOSIS — D12.8 BENIGN NEOPLASM OF RECTUM: ICD-10-CM

## 2020-12-24 DIAGNOSIS — D13.1 BENIGN NEOPLASM OF STOMACH: ICD-10-CM

## 2020-12-24 DIAGNOSIS — K62.5 HEMORRHAGE OF ANUS AND RECTUM: ICD-10-CM

## 2020-12-24 DIAGNOSIS — K21.9 GASTRO-ESOPHAGEAL REFLUX DISEASE WITHOUT ESOPHAGITIS: ICD-10-CM

## 2020-12-24 DIAGNOSIS — Z86.010 PERSONAL HISTORY OF COLONIC POLYPS: ICD-10-CM

## 2020-12-28 LAB
CASE RPRT: NORMAL
PATH REPORT.FINAL DX SPEC: NORMAL

## 2020-12-29 LAB — AP REPORT: NORMAL

## 2020-12-30 ENCOUNTER — OFFICE VISIT (OUTPATIENT)
Dept: NEUROLOGY | Facility: CLINIC | Age: 40
End: 2020-12-30
Payer: COMMERCIAL

## 2020-12-30 VITALS
WEIGHT: 284 LBS | HEART RATE: 72 BPM | RESPIRATION RATE: 16 BRPM | SYSTOLIC BLOOD PRESSURE: 126 MMHG | HEIGHT: 66 IN | BODY MASS INDEX: 45.64 KG/M2 | DIASTOLIC BLOOD PRESSURE: 70 MMHG

## 2020-12-30 DIAGNOSIS — F98.8 ATTENTION DEFICIT DISORDER (ADD) WITHOUT HYPERACTIVITY: ICD-10-CM

## 2020-12-30 DIAGNOSIS — G43.109 MIGRAINE WITH AURA AND WITHOUT STATUS MIGRAINOSUS, NOT INTRACTABLE: Primary | ICD-10-CM

## 2020-12-30 PROCEDURE — 3074F SYST BP LT 130 MM HG: CPT | Performed by: PHYSICIAN ASSISTANT

## 2020-12-30 PROCEDURE — 3078F DIAST BP <80 MM HG: CPT | Performed by: PHYSICIAN ASSISTANT

## 2020-12-30 PROCEDURE — 3008F BODY MASS INDEX DOCD: CPT | Performed by: PHYSICIAN ASSISTANT

## 2020-12-30 PROCEDURE — 99213 OFFICE O/P EST LOW 20 MIN: CPT | Performed by: PHYSICIAN ASSISTANT

## 2020-12-30 RX ORDER — ZOLMITRIPTAN 5 MG/1
TABLET, FILM COATED ORAL
Qty: 8 TABLET | Refills: 0 | Status: SHIPPED | OUTPATIENT
Start: 2020-12-30 | End: 2021-04-26

## 2020-12-30 NOTE — PROGRESS NOTES
[facility] with [address]  4/28/1980  Primary Care Provider:  Christin Friend MD    12/30/2020  Accompanied visit: No     47year old female patient being seen for: Yudelka This is a 30 day supply. , Disp: 8 tablet, Rfl: 0  •  Amphetamine-Dextroamphet ER (ADDERALL XR) 10 MG Oral Capsule SR 24 Hr, Take 1 capsule (10 mg total) by mouth every morning., Disp: 30 capsule, Rfl: 0  •  Levothyroxine Sodium 112 MCG Oral Tab, Take 112 m that if needed, based on condition and or test results, follow up will be readjusted      ALEXI Cuevas  12/30/2020, Time completed 3:00 PM

## 2020-12-31 ENCOUNTER — DOCUMENTATION (OUTPATIENT)
Dept: GASTROENTEROLOGY | Age: 40
End: 2020-12-31

## 2020-12-31 RX ORDER — ERENUMAB-AOOE 70 MG/ML
70 INJECTION SUBCUTANEOUS
Qty: 3 PEN | Refills: 1 | Status: SHIPPED | OUTPATIENT
Start: 2020-12-31 | End: 2021-06-11

## 2021-01-07 ENCOUNTER — TELEPHONE (OUTPATIENT)
Dept: GASTROENTEROLOGY | Age: 41
End: 2021-01-07

## 2021-02-18 DIAGNOSIS — F90.0 ATTENTION DEFICIT HYPERACTIVITY DISORDER (ADHD), PREDOMINANTLY INATTENTIVE TYPE: ICD-10-CM

## 2021-02-19 NOTE — TELEPHONE ENCOUNTER
Medication: Adderall 10 mg    Date of last refill:12/09/20  [place] 88/10/77    Last office visit: 12/30/20  Due back to clinic per last office note:  RTN in 6 months  Date next office visit scheduled:    Future Appointm

## 2021-02-21 RX ORDER — DEXTROAMPHETAMINE SACCHARATE, AMPHETAMINE ASPARTATE MONOHYDRATE, DEXTROAMPHETAMINE SULFATE AND AMPHETAMINE SULFATE 2.5; 2.5; 2.5; 2.5 MG/1; MG/1; MG/1; MG/1
10 CAPSULE, EXTENDED RELEASE ORAL EVERY MORNING
Qty: 30 CAPSULE | Refills: 0 | Status: SHIPPED | OUTPATIENT
Start: 2021-02-21 | End: 2021-04-26

## 2021-04-26 ENCOUNTER — TELEPHONE (OUTPATIENT)
Dept: NEUROLOGY | Facility: CLINIC | Age: 41
End: 2021-04-26

## 2021-04-26 DIAGNOSIS — G43.009 MIGRAINE WITHOUT AURA AND WITHOUT STATUS MIGRAINOSUS, NOT INTRACTABLE: Primary | ICD-10-CM

## 2021-04-26 DIAGNOSIS — F90.0 ATTENTION DEFICIT HYPERACTIVITY DISORDER (ADHD), PREDOMINANTLY INATTENTIVE TYPE: ICD-10-CM

## 2021-04-26 RX ORDER — ZOLMITRIPTAN 5 MG/1
TABLET, FILM COATED ORAL
Qty: 8 TABLET | Refills: 0 | Status: CANCELLED | OUTPATIENT
Start: 2021-04-26

## 2021-04-26 RX ORDER — ZOLMITRIPTAN 5 MG/1
TABLET, FILM COATED ORAL
Qty: 8 TABLET | Refills: 3 | Status: SHIPPED | OUTPATIENT
Start: 2021-04-26 | End: 2021-04-27

## 2021-04-26 RX ORDER — DEXTROAMPHETAMINE SACCHARATE, AMPHETAMINE ASPARTATE MONOHYDRATE, DEXTROAMPHETAMINE SULFATE AND AMPHETAMINE SULFATE 2.5; 2.5; 2.5; 2.5 MG/1; MG/1; MG/1; MG/1
10 CAPSULE, EXTENDED RELEASE ORAL EVERY MORNING
Qty: 30 CAPSULE | Refills: 0 | Status: SHIPPED | OUTPATIENT
Start: 2021-04-26 | End: 2021-11-22

## 2021-04-26 NOTE — TELEPHONE ENCOUNTER
Medication: Zolmitriptan 5 MG Oral Tab    Date of last refill: 12/30/2020 (#8/0)  [place] 41/53/5048    Last office visit: 12/30/2020  Due back to clinic per last office note:  6 months  Date next office visit scheduled:

## 2021-04-26 NOTE — TELEPHONE ENCOUNTER
Medication: Amphetamine-Dextroamphet ER (ADDERALL XR) 10 MG Oral Capsule SR 24 Hr    Date of last refill: 2/21/21 (#30/0)  [place] 6/85/5913    Last office visit: 12/30/2020  Due back to clinic per last office note:  6/3

## 2021-04-27 ENCOUNTER — TELEPHONE (OUTPATIENT)
Dept: NEUROLOGY | Facility: CLINIC | Age: 41
End: 2021-04-27

## 2021-04-27 DIAGNOSIS — G43.009 MIGRAINE WITHOUT AURA AND WITHOUT STATUS MIGRAINOSUS, NOT INTRACTABLE: ICD-10-CM

## 2021-04-27 RX ORDER — ZOLMITRIPTAN 5 MG/1
TABLET, FILM COATED ORAL
Qty: 24 TABLET | Refills: 1 | Status: SHIPPED | OUTPATIENT
Start: 2021-04-27 | End: 2021-06-11

## 2021-04-27 NOTE — TELEPHONE ENCOUNTER
Patient requested prescription for Zolmitriptan be sent to mail order pharmacy for 90 day supply. Forwarded prescription as requested to [facility].

## 2021-04-27 NOTE — TELEPHONE ENCOUNTER
Patient requesting that a 90 day supply being [facility] not [facility]. Please call patient to advise if this is a possibility.

## 2021-06-11 ENCOUNTER — OFFICE VISIT (OUTPATIENT)
Dept: NEUROLOGY | Facility: CLINIC | Age: 41
End: 2021-06-11
Payer: COMMERCIAL

## 2021-06-11 VITALS
HEART RATE: 74 BPM | RESPIRATION RATE: 16 BRPM | HEIGHT: 66 IN | DIASTOLIC BLOOD PRESSURE: 74 MMHG | BODY MASS INDEX: 45.64 KG/M2 | WEIGHT: 284 LBS | SYSTOLIC BLOOD PRESSURE: 122 MMHG

## 2021-06-11 DIAGNOSIS — G43.109 MIGRAINE WITH AURA AND WITHOUT STATUS MIGRAINOSUS, NOT INTRACTABLE: ICD-10-CM

## 2021-06-11 DIAGNOSIS — F90.0 ATTENTION DEFICIT HYPERACTIVITY DISORDER (ADHD), PREDOMINANTLY INATTENTIVE TYPE: ICD-10-CM

## 2021-06-11 DIAGNOSIS — G43.009 MIGRAINE WITHOUT AURA AND WITHOUT STATUS MIGRAINOSUS, NOT INTRACTABLE: ICD-10-CM

## 2021-06-11 PROCEDURE — 3078F DIAST BP <80 MM HG: CPT | Performed by: OTHER

## 2021-06-11 PROCEDURE — 3074F SYST BP LT 130 MM HG: CPT | Performed by: OTHER

## 2021-06-11 PROCEDURE — 99214 OFFICE O/P EST MOD 30 MIN: CPT | Performed by: OTHER

## 2021-06-11 PROCEDURE — 3008F BODY MASS INDEX DOCD: CPT | Performed by: OTHER

## 2021-06-11 RX ORDER — DEXTROAMPHETAMINE SACCHARATE, AMPHETAMINE ASPARTATE MONOHYDRATE, DEXTROAMPHETAMINE SULFATE AND AMPHETAMINE SULFATE 3.75; 3.75; 3.75; 3.75 MG/1; MG/1; MG/1; MG/1
15 CAPSULE, EXTENDED RELEASE ORAL DAILY
Qty: 30 CAPSULE | Refills: 0 | Status: SHIPPED | OUTPATIENT
Start: 2021-07-12 | End: 2021-06-11

## 2021-06-11 RX ORDER — ERENUMAB-AOOE 70 MG/ML
70 INJECTION SUBCUTANEOUS
Qty: 3 EACH | Refills: 3 | Status: SHIPPED | OUTPATIENT
Start: 2021-06-11

## 2021-06-11 RX ORDER — DEXTROAMPHETAMINE SACCHARATE, AMPHETAMINE ASPARTATE MONOHYDRATE, DEXTROAMPHETAMINE SULFATE AND AMPHETAMINE SULFATE 3.75; 3.75; 3.75; 3.75 MG/1; MG/1; MG/1; MG/1
15 CAPSULE, EXTENDED RELEASE ORAL DAILY
Qty: 30 CAPSULE | Refills: 0 | Status: SHIPPED | OUTPATIENT
Start: 2021-06-11 | End: 2021-07-11

## 2021-06-11 RX ORDER — DEXTROAMPHETAMINE SACCHARATE, AMPHETAMINE ASPARTATE MONOHYDRATE, DEXTROAMPHETAMINE SULFATE AND AMPHETAMINE SULFATE 3.75; 3.75; 3.75; 3.75 MG/1; MG/1; MG/1; MG/1
15 CAPSULE, EXTENDED RELEASE ORAL DAILY
Qty: 30 CAPSULE | Refills: 0 | Status: SHIPPED | OUTPATIENT
Start: 2021-07-12 | End: 2021-08-11

## 2021-06-11 RX ORDER — DEXTROAMPHETAMINE SACCHARATE, AMPHETAMINE ASPARTATE MONOHYDRATE, DEXTROAMPHETAMINE SULFATE AND AMPHETAMINE SULFATE 3.75; 3.75; 3.75; 3.75 MG/1; MG/1; MG/1; MG/1
15 CAPSULE, EXTENDED RELEASE ORAL DAILY
Qty: 30 CAPSULE | Refills: 0 | Status: SHIPPED | OUTPATIENT
Start: 2021-08-12 | End: 2021-09-11

## 2021-06-11 RX ORDER — DEXTROAMPHETAMINE SACCHARATE, AMPHETAMINE ASPARTATE MONOHYDRATE, DEXTROAMPHETAMINE SULFATE AND AMPHETAMINE SULFATE 3.75; 3.75; 3.75; 3.75 MG/1; MG/1; MG/1; MG/1
15 CAPSULE, EXTENDED RELEASE ORAL DAILY
Qty: 30 CAPSULE | Refills: 0 | Status: SHIPPED | OUTPATIENT
Start: 2021-08-12 | End: 2021-06-11

## 2021-06-11 RX ORDER — DEXTROAMPHETAMINE SACCHARATE, AMPHETAMINE ASPARTATE MONOHYDRATE, DEXTROAMPHETAMINE SULFATE AND AMPHETAMINE SULFATE 3.75; 3.75; 3.75; 3.75 MG/1; MG/1; MG/1; MG/1
15 CAPSULE, EXTENDED RELEASE ORAL DAILY
Qty: 30 CAPSULE | Refills: 0 | Status: SHIPPED | OUTPATIENT
Start: 2021-06-11 | End: 2021-06-11

## 2021-06-11 RX ORDER — ZOLMITRIPTAN 5 MG/1
TABLET, FILM COATED ORAL
Qty: 24 TABLET | Refills: 3 | Status: SHIPPED | OUTPATIENT
Start: 2021-06-11

## 2021-06-11 NOTE — PROGRESS NOTES
[facility] with [address]  4/28/1980  Primary Care Provider:  Tyshawn Hair MD    6/11/2021  Accompanied visit:      (x) No.      46year old yo patient being seen for: EXAM:  /74 (BP Location: Left arm, Patient Position: Sitting, Cuff Size: large)   Pulse 74   Resp 16   Ht 66\"   Wt 284 lb (128.8 kg)   BMI 45.84 kg/m²   Looks stated age  General Exam:  HENT:  pink conjunctiva anicteric sclerae  Neck no adenopat Refill:  0      Amphetamine-Dextroamphet ER (ADDERALL XR) 15 MG Oral Capsule SR 24 Hr          Sig: Take 1 capsule (15 mg total) by mouth daily. Fill prescription in 30 days.           Dispense:  30 capsule          Refill:  0      Amphetamine-Dextroamphet

## 2021-06-15 ENCOUNTER — TELEPHONE (OUTPATIENT)
Dept: NEUROLOGY | Facility: CLINIC | Age: 41
End: 2021-06-15

## 2021-06-15 DIAGNOSIS — G43.709 CHRONIC MIGRAINE WITHOUT AURA WITHOUT STATUS MIGRAINOSUS, NOT INTRACTABLE: Primary | ICD-10-CM

## 2021-06-15 NOTE — TELEPHONE ENCOUNTER
Rec'd call from [facility] reporting that PA is needed for Aimovig. No pharmacy benefits information on patient's insurance card to start PA. Sent patient a JoMaJa message requesting she provide PBM info.

## 2021-06-16 ENCOUNTER — TELEPHONE (OUTPATIENT)
Dept: NEUROLOGY | Facility: CLINIC | Age: 41
End: 2021-06-16

## 2021-06-16 DIAGNOSIS — G43.009 MIGRAINE WITHOUT AURA AND WITHOUT STATUS MIGRAINOSUS, NOT INTRACTABLE: ICD-10-CM

## 2021-06-16 RX ORDER — ZOLMITRIPTAN 5 MG/1
TABLET, FILM COATED ORAL
Qty: 24 TABLET | Refills: 3 | Status: CANCELLED | OUTPATIENT
Start: 2021-06-16

## 2021-06-16 NOTE — TELEPHONE ENCOUNTER
Spoke with [facility] who state that insurance will only pay for 18/24 tabs. Was given phone number [phone] for patient to appeal to insurance company. Spoke with patient, gave phone number.   Also informed patient that they are able to pay out o

## 2021-06-16 NOTE — TELEPHONE ENCOUNTER
RN spoke to Rimma and received her Prescription information.   BIN: [id number]  Group: Pascual Apple  Member [id number]  [address] [facility] [phone]

## 2021-06-16 NOTE — TELEPHONE ENCOUNTER
Patient calling, advised that [facility] is giving her 18 doses of zolmatriptan and she is not sure why they are not filling the full 24 that were ordered. She would like to speak to a nurse to clarify. Please advise.

## 2021-06-23 ENCOUNTER — OFFICE VISIT (OUTPATIENT)
Dept: ORTHOPEDICS CLINIC | Facility: CLINIC | Age: 41
End: 2021-06-23
Payer: COMMERCIAL

## 2021-06-23 VITALS — BODY MASS INDEX: 44.41 KG/M2 | WEIGHT: 293 LBS | HEIGHT: 68 IN

## 2021-06-23 DIAGNOSIS — M22.41 CHONDROMALACIA PATELLAE, RIGHT KNEE: Primary | ICD-10-CM

## 2021-06-23 PROCEDURE — 99213 OFFICE O/P EST LOW 20 MIN: CPT | Performed by: ORTHOPAEDIC SURGERY

## 2021-06-23 PROCEDURE — 20610 DRAIN/INJ JOINT/BURSA W/O US: CPT | Performed by: ORTHOPAEDIC SURGERY

## 2021-06-23 PROCEDURE — 3008F BODY MASS INDEX DOCD: CPT | Performed by: ORTHOPAEDIC SURGERY

## 2021-06-23 RX ORDER — TRIAMCINOLONE ACETONIDE 40 MG/ML
40 INJECTION, SUSPENSION INTRA-ARTICULAR; INTRAMUSCULAR ONCE
Status: COMPLETED | OUTPATIENT
Start: 2021-06-23 | End: 2021-06-23

## 2021-06-23 RX ADMIN — TRIAMCINOLONE ACETONIDE 40 MG: 40 INJECTION, SUSPENSION INTRA-ARTICULAR; INTRAMUSCULAR at 14:07:00

## 2021-06-23 NOTE — PROGRESS NOTES
[facility] Follow-up Progress Note        Chief Complaint:  Right knee pain     History:  The patient is a 46year old female who returns due to recurrent pain at the right knee.   The patient is diagnosed with chondromalacia and has responded t limited to needle infection, hypersensitivity reaction or failed improvement, the patient gave written and verbal consent to proceed.   Using meticulous sterile technique I injected 4 cc of 1% Xylocaine mixed with 40mg of Kenalog at the joanna-lateral joint

## 2021-06-25 NOTE — TELEPHONE ENCOUNTER
Per [facility]: ZKEXUZ:[id number];KCDRDR:[id number]; Review Type:Prior Auth; Coverage Start Date:05/17/2021; Coverage End Date:06/16/2022;    Patient notified.

## 2021-08-11 RX ORDER — DEXTROAMPHETAMINE SACCHARATE, AMPHETAMINE ASPARTATE MONOHYDRATE, DEXTROAMPHETAMINE SULFATE AND AMPHETAMINE SULFATE 3.75; 3.75; 3.75; 3.75 MG/1; MG/1; MG/1; MG/1
15 CAPSULE, EXTENDED RELEASE ORAL DAILY
Qty: 30 CAPSULE | Refills: 0 | Status: SHIPPED | OUTPATIENT
Start: 2021-08-11 | End: 2021-09-10

## 2021-08-11 NOTE — TELEPHONE ENCOUNTER
Medication: Adderall 15 mg    Date of last refill:07/12/21  [place] 52/66/97    Last office visit: 06/11/21  Due back to clinic per last office note:  RTN in 6 months  Date next office visit scheduled:    Future Appointm

## 2021-11-14 NOTE — PATIENT INSTRUCTIONS
Refill policies:    • Allow 2-3 business days for refills; controlled substances may take longer.   • Contact your pharmacy at least 5 days prior to running out of medication and have them send an electronic request or submit request through the “request re entire amount billed. Precertification and Prior Authorizations: If your physician has recommended that you have a procedure or additional testing performed.   [facility]) will contact your insurance carrier to obtain pre-certi stated

## 2021-11-22 ENCOUNTER — OFFICE VISIT (OUTPATIENT)
Dept: NEUROLOGY | Facility: CLINIC | Age: 41
End: 2021-11-22
Payer: COMMERCIAL

## 2021-11-22 VITALS
RESPIRATION RATE: 16 BRPM | HEART RATE: 96 BPM | BODY MASS INDEX: 44.41 KG/M2 | WEIGHT: 293 LBS | HEIGHT: 68 IN | SYSTOLIC BLOOD PRESSURE: 132 MMHG | DIASTOLIC BLOOD PRESSURE: 86 MMHG

## 2021-11-22 DIAGNOSIS — G43.009 MIGRAINE WITHOUT AURA AND WITHOUT STATUS MIGRAINOSUS, NOT INTRACTABLE: Primary | ICD-10-CM

## 2021-11-22 DIAGNOSIS — F98.8 ATTENTION DEFICIT DISORDER (ADD) WITHOUT HYPERACTIVITY: ICD-10-CM

## 2021-11-22 DIAGNOSIS — E53.8 VITAMIN B12 DEFICIENCY: ICD-10-CM

## 2021-11-22 PROCEDURE — 3075F SYST BP GE 130 - 139MM HG: CPT | Performed by: PHYSICIAN ASSISTANT

## 2021-11-22 PROCEDURE — 3079F DIAST BP 80-89 MM HG: CPT | Performed by: PHYSICIAN ASSISTANT

## 2021-11-22 PROCEDURE — 3008F BODY MASS INDEX DOCD: CPT | Performed by: PHYSICIAN ASSISTANT

## 2021-11-22 PROCEDURE — 99214 OFFICE O/P EST MOD 30 MIN: CPT | Performed by: PHYSICIAN ASSISTANT

## 2021-11-22 RX ORDER — DEXTROAMPHETAMINE SACCHARATE, AMPHETAMINE ASPARTATE MONOHYDRATE, DEXTROAMPHETAMINE SULFATE AND AMPHETAMINE SULFATE 3.75; 3.75; 3.75; 3.75 MG/1; MG/1; MG/1; MG/1
15 CAPSULE, EXTENDED RELEASE ORAL EVERY MORNING
COMMUNITY
End: 2021-11-22

## 2021-11-22 RX ORDER — CYANOCOBALAMIN 1000 UG/ML
1000 INJECTION INTRAMUSCULAR; SUBCUTANEOUS
Qty: 10 ML | Refills: 0 | Status: SHIPPED | OUTPATIENT
Start: 2021-11-22 | End: 2022-01-27

## 2021-11-22 RX ORDER — SUMATRIPTAN 5 MG/1
SPRAY NASAL
Qty: 6 EACH | Refills: 2 | Status: SHIPPED | OUTPATIENT
Start: 2021-11-22

## 2021-11-22 RX ORDER — LEVOTHYROXINE SODIUM 0.12 MG/1
125 TABLET ORAL
COMMUNITY

## 2021-11-22 RX ORDER — DEXTROAMPHETAMINE SACCHARATE, AMPHETAMINE ASPARTATE MONOHYDRATE, DEXTROAMPHETAMINE SULFATE AND AMPHETAMINE SULFATE 3.75; 3.75; 3.75; 3.75 MG/1; MG/1; MG/1; MG/1
15 CAPSULE, EXTENDED RELEASE ORAL EVERY MORNING
Qty: 30 CAPSULE | Refills: 0 | Status: SHIPPED | OUTPATIENT
Start: 2021-11-22 | End: 2021-12-26

## 2021-11-22 NOTE — PROGRESS NOTES
[facility] with [address]  4/28/1980  Primary Care Provider:  Ivan Henao MD    11/22/2021  Accompanied visit: No  Previous visit and existing record notes reviewed in Take 125 mcg by mouth before breakfast., Disp: , Rfl:   •  Amphetamine-Dextroamphet ER 15 MG Oral Capsule SR 24 Hr, Take 1 capsule (15 mg total) by mouth every morning., Disp: 30 capsule, Rfl: 0  •  SUMAtriptan 5 MG/ACT Nasal Solution, 1 actuation in each B12 deficiency  Attention deficit disorder (ADD) without hyperactivity      Discussion plus Diagnostics & Treatment Orders:    Migraines- controlled. Continue the Aimovig 70mg monthly for headache prevention.  Can use the imitrex or zomig for abortive tx  V

## 2021-11-29 ENCOUNTER — TELEPHONE (OUTPATIENT)
Dept: NEUROLOGY | Facility: CLINIC | Age: 41
End: 2021-11-29

## 2021-11-29 NOTE — TELEPHONE ENCOUNTER
[facility] lab results dated 11/26/21.   Placed on [employer] desk and sent to scanning on 11/29/21

## 2021-12-06 ENCOUNTER — TELEPHONE (OUTPATIENT)
Dept: NEUROLOGY | Facility: CLINIC | Age: 41
End: 2021-12-06

## 2021-12-07 NOTE — TELEPHONE ENCOUNTER
RN called patient and was informed Xavi Armijo was calling the patient about her labs and missed the patient. Patient was wondering what specifically she wanted.      RN advised she would try to discuss with the provider being a little more specific or possibly s

## 2021-12-08 NOTE — TELEPHONE ENCOUNTER
Spoke to patient and informed [facility] B12 level is 338 recommended proceeding with vitamin B12 injections every 2 weeks and recheck a level in 3 months. She expressed full understanding.

## 2021-12-27 RX ORDER — DEXTROAMPHETAMINE SACCHARATE, AMPHETAMINE ASPARTATE MONOHYDRATE, DEXTROAMPHETAMINE SULFATE AND AMPHETAMINE SULFATE 3.75; 3.75; 3.75; 3.75 MG/1; MG/1; MG/1; MG/1
15 CAPSULE, EXTENDED RELEASE ORAL EVERY MORNING
Qty: 30 CAPSULE | Refills: 0 | Status: SHIPPED | OUTPATIENT
Start: 2021-12-27 | End: 2022-01-26

## 2021-12-27 NOTE — TELEPHONE ENCOUNTER
Medication: Adderall 15 mg     Date of last refill: 11/22/2021  [place]22/2021     Last office visit: 11/22/2021  Due back to clinic per last office note:  RTN in 6 months  Date next office visit scheduled:    Future

## 2022-01-21 ENCOUNTER — HOSPITAL ENCOUNTER (OUTPATIENT)
Dept: GENERAL RADIOLOGY | Age: 42
Discharge: HOME | End: 2022-01-21
Attending: ORTHOPAEDIC SURGERY
Payer: COMMERCIAL

## 2022-01-21 ENCOUNTER — TELEPHONE (OUTPATIENT)
Dept: ORTHOPEDICS CLINIC | Facility: CLINIC | Age: 42
End: 2022-01-21

## 2022-01-21 DIAGNOSIS — Z01.89 ENCOUNTER FOR LOWER EXTREMITY COMPARISON IMAGING STUDY: ICD-10-CM

## 2022-01-21 DIAGNOSIS — M25.561 RIGHT KNEE PAIN, UNSPECIFIED CHRONICITY: ICD-10-CM

## 2022-01-21 DIAGNOSIS — M25.561 RIGHT KNEE PAIN, UNSPECIFIED CHRONICITY: Primary | ICD-10-CM

## 2022-01-21 PROCEDURE — 73562 X-RAY EXAM OF KNEE 3: CPT | Performed by: ORTHOPAEDIC SURGERY

## 2022-01-21 PROCEDURE — 73564 X-RAY EXAM KNEE 4 OR MORE: CPT | Performed by: ORTHOPAEDIC SURGERY

## 2022-01-21 NOTE — TELEPHONE ENCOUNTER
Patient informed, that xrays are required before MRI. Patient verbalized understanding and will have xrays done first. Once xrays are completed, pt would like Dr Jyoti Duque to review the images and determine if an MRI is appropriate.  Advised pt she may need

## 2022-01-21 NOTE — TELEPHONE ENCOUNTER
Patient scheduled appt on 2/16 for right knee pain. Patient is asking if provider would like to do an MRI before she comes in to next appt.

## 2022-01-24 ENCOUNTER — OFFICE VISIT (OUTPATIENT)
Dept: GASTROENTEROLOGY | Age: 42
End: 2022-01-24

## 2022-01-24 VITALS — BODY MASS INDEX: 44.41 KG/M2 | WEIGHT: 293 LBS | HEIGHT: 68 IN

## 2022-01-24 DIAGNOSIS — K60.2 ANAL FISSURE: ICD-10-CM

## 2022-01-24 DIAGNOSIS — K64.9 HEMORRHOIDS, UNSPECIFIED HEMORRHOID TYPE: ICD-10-CM

## 2022-01-24 DIAGNOSIS — K21.9 CHRONIC GERD: Primary | ICD-10-CM

## 2022-01-24 DIAGNOSIS — K29.60 EROSIVE GASTRITIS: ICD-10-CM

## 2022-01-24 DIAGNOSIS — K92.1 HEMATOCHEZIA: ICD-10-CM

## 2022-01-24 DIAGNOSIS — Z86.010 PERSONAL HISTORY OF COLONIC POLYPS: ICD-10-CM

## 2022-01-24 PROCEDURE — 99215 OFFICE O/P EST HI 40 MIN: CPT | Performed by: INTERNAL MEDICINE

## 2022-01-24 RX ORDER — SEMAGLUTIDE 0.25 MG/.5ML
INJECTION, SOLUTION SUBCUTANEOUS
COMMUNITY
Start: 2022-01-19 | End: 2023-02-17 | Stop reason: DRUGHIGH

## 2022-01-24 RX ORDER — SUMATRIPTAN 5 MG/1
SPRAY NASAL
COMMUNITY
Start: 2021-11-22

## 2022-01-24 RX ORDER — CELECOXIB 400 MG/1
CAPSULE ORAL
COMMUNITY
Start: 2021-11-04 | End: 2023-06-19 | Stop reason: ALTCHOICE

## 2022-01-24 RX ORDER — OMEPRAZOLE 40 MG/1
40 CAPSULE, DELAYED RELEASE ORAL DAILY
Qty: 90 CAPSULE | Refills: 3 | Status: SHIPPED | OUTPATIENT
Start: 2022-01-24 | End: 2022-06-28 | Stop reason: DRUGHIGH

## 2022-01-27 RX ORDER — DEXTROAMPHETAMINE SACCHARATE, AMPHETAMINE ASPARTATE MONOHYDRATE, DEXTROAMPHETAMINE SULFATE AND AMPHETAMINE SULFATE 3.75; 3.75; 3.75; 3.75 MG/1; MG/1; MG/1; MG/1
15 CAPSULE, EXTENDED RELEASE ORAL EVERY MORNING
Qty: 30 CAPSULE | Refills: 0 | Status: SHIPPED | OUTPATIENT
Start: 2022-01-27

## 2022-01-27 RX ORDER — CYANOCOBALAMIN 1000 UG/ML
1000 INJECTION INTRAMUSCULAR; SUBCUTANEOUS
Qty: 10 ML | Refills: 1 | Status: SHIPPED | OUTPATIENT
Start: 2022-01-27

## 2022-01-27 NOTE — TELEPHONE ENCOUNTER
Medication: Cyanocobalamin 1 mL     Date of last refill: 11/22/2021  [place]  Last office visit: 11/22/2021  Due back to clinic per last office note:  RTN in 6 months  Date next office visit scheduled:           Future

## 2022-01-27 NOTE — TELEPHONE ENCOUNTER
Medication: Amphetamine-Dextroamphet ER 15 MG       Date of last refill: 12/27/21 (#30/0R)  [place] 18/74/50     Last office visit: 11/22/21  Due back to clinic per last office note:  6 mon  Date next office visit schedu

## 2022-02-16 ENCOUNTER — OFFICE VISIT (OUTPATIENT)
Dept: ORTHOPEDICS CLINIC | Facility: CLINIC | Age: 42
End: 2022-02-16
Payer: COMMERCIAL

## 2022-02-16 VITALS — BODY MASS INDEX: 44.41 KG/M2 | HEIGHT: 68 IN | WEIGHT: 293 LBS

## 2022-02-16 DIAGNOSIS — S83.231A COMPLEX TEAR OF MEDIAL MENISCUS OF RIGHT KNEE AS CURRENT INJURY, INITIAL ENCOUNTER: Primary | ICD-10-CM

## 2022-02-16 PROCEDURE — 3008F BODY MASS INDEX DOCD: CPT | Performed by: ORTHOPAEDIC SURGERY

## 2022-02-16 PROCEDURE — 99213 OFFICE O/P EST LOW 20 MIN: CPT | Performed by: ORTHOPAEDIC SURGERY

## 2022-02-16 RX ORDER — SEMAGLUTIDE 0.5 MG/.5ML
INJECTION, SOLUTION SUBCUTANEOUS
COMMUNITY
Start: 2022-02-09

## 2022-02-16 RX ORDER — PEN NEEDLE, DIABETIC 29 G X1/2"
NEEDLE, DISPOSABLE MISCELLANEOUS
COMMUNITY
Start: 2021-11-05

## 2022-02-16 RX ORDER — METFORMIN HYDROCHLORIDE 500 MG/1
TABLET, EXTENDED RELEASE ORAL
COMMUNITY
Start: 2022-01-10

## 2022-02-16 RX ORDER — CELECOXIB 400 MG/1
CAPSULE ORAL
COMMUNITY
Start: 2021-11-04

## 2022-02-16 NOTE — PROGRESS NOTES
Patient is a 72-year-old white female here for evaluation of her left knee. I have seen her in the past for this problem. She noted recently that she has had a couple of episodes of the knee is become very painful and difficulties in walking. Patient has had a prior diagnosis of chondromalacia patella of the knee. Patient describes her more recent pains as abrupt in nature and sharp difficulty in walking. Patient's exam shows that have a nonantalgic gait. Exam of her left knee shows her to have tenderness along the medial and lateral aspect of the knee. Mild effusion of the knee is noted. Collateral ligaments are stable. Mild crepitation patellofemoral joint left knee. Range of motion shows full extension flexion about 125 degrees. X-rays of the left knee shows fairly well-maintained joint spaces. Slight narrowing medially. Right knee shows moderate narrowing medially. Mild joint space narrowing as well in the patellofemoral joint laterally. Impression is that a possible meniscus tear medially of the left knee. Second impression is the chondromalacia patellofemoral joints of the knees bilaterally. In light of the severity of the pain and difficulties in walking associated with it I advise going ahead with an MRI scan of the left knee. We will follow-up with her after that is completed. Patient understands that there may not be an option of arthroscopy if there is substantial articular cartilage wearing. Follow-up after the test is completed.

## 2022-02-23 ENCOUNTER — HOSPITAL ENCOUNTER (OUTPATIENT)
Dept: MRI IMAGING | Facility: HOSPITAL | Age: 42
Discharge: HOME | End: 2022-02-23
Attending: ORTHOPAEDIC SURGERY
Payer: COMMERCIAL

## 2022-02-23 DIAGNOSIS — S83.231A COMPLEX TEAR OF MEDIAL MENISCUS OF RIGHT KNEE AS CURRENT INJURY, INITIAL ENCOUNTER: ICD-10-CM

## 2022-02-23 PROCEDURE — 73721 MRI JNT OF LWR EXTRE W/O DYE: CPT | Performed by: ORTHOPAEDIC SURGERY

## 2022-02-25 ENCOUNTER — TELEPHONE (OUTPATIENT)
Dept: ORTHOPEDICS CLINIC | Facility: CLINIC | Age: 42
End: 2022-02-25

## 2022-02-25 RX ORDER — DEXTROAMPHETAMINE SACCHARATE, AMPHETAMINE ASPARTATE MONOHYDRATE, DEXTROAMPHETAMINE SULFATE AND AMPHETAMINE SULFATE 3.75; 3.75; 3.75; 3.75 MG/1; MG/1; MG/1; MG/1
15 CAPSULE, EXTENDED RELEASE ORAL EVERY MORNING
Qty: 30 CAPSULE | Refills: 0 | Status: SHIPPED | OUTPATIENT
Start: 2022-02-25 | End: 2022-04-01

## 2022-02-25 NOTE — TELEPHONE ENCOUNTER
Patient has appointment to come in 3/25 to go over MRI. Patient would like to come in a lot sooner if possible. Please advise patient if she can come in sooner to go over MRI.

## 2022-02-25 NOTE — TELEPHONE ENCOUNTER
Spoke to pt, requesting sooner appt to review MRI. Was able to r/s pt in open slot on 3/9/22 at 4:00PM.    Pt requesting if Dr. Dina Singleton recommends PT, then to place referral order now so she schedule now.   Pending for approval

## 2022-02-28 ENCOUNTER — TELEPHONE (OUTPATIENT)
Dept: NEUROLOGY | Facility: CLINIC | Age: 42
End: 2022-02-28

## 2022-02-28 NOTE — TELEPHONE ENCOUNTER
Pt called to see if we had a copy of her neuropsych eval in Dr. Rosi Boast private practice notes. Reviewed online [facility] rrecords but was unable to find copy of report. Was able to narrow timeframe down to some time between Aug and November of 2009. There was not a reference to psychologist's name. Pt thanked me for the information and understood records are around 15years old.

## 2022-03-01 RX ORDER — DEXTROAMPHETAMINE SACCHARATE, AMPHETAMINE ASPARTATE MONOHYDRATE, DEXTROAMPHETAMINE SULFATE AND AMPHETAMINE SULFATE 3.75; 3.75; 3.75; 3.75 MG/1; MG/1; MG/1; MG/1
15 CAPSULE, EXTENDED RELEASE ORAL EVERY MORNING
Qty: 30 CAPSULE | Refills: 0 | Status: CANCELLED | OUTPATIENT
Start: 2022-03-01

## 2022-03-09 ENCOUNTER — LAB ENCOUNTER (OUTPATIENT)
Dept: LAB | Age: 42
End: 2022-03-09
Attending: PHYSICIAN ASSISTANT
Payer: COMMERCIAL

## 2022-03-09 ENCOUNTER — OFFICE VISIT (OUTPATIENT)
Dept: ORTHOPEDICS CLINIC | Facility: CLINIC | Age: 42
End: 2022-03-09
Payer: COMMERCIAL

## 2022-03-09 DIAGNOSIS — E53.8 VITAMIN B12 DEFICIENCY: ICD-10-CM

## 2022-03-09 DIAGNOSIS — M65.9 SYNOVITIS OF RIGHT KNEE: Primary | ICD-10-CM

## 2022-03-09 DIAGNOSIS — M22.41 PATELLA, CHONDROMALACIA, RIGHT: ICD-10-CM

## 2022-03-09 DIAGNOSIS — M94.261 CHONDROMALACIA OF MEDIAL FEMORAL CONDYLE, RIGHT: ICD-10-CM

## 2022-03-09 LAB — VIT B12 SERPL-MCNC: 607 PG/ML (ref 193–986)

## 2022-03-09 PROCEDURE — 82607 VITAMIN B-12: CPT

## 2022-03-09 PROCEDURE — 99213 OFFICE O/P EST LOW 20 MIN: CPT | Performed by: ORTHOPAEDIC SURGERY

## 2022-03-09 PROCEDURE — 36415 COLL VENOUS BLD VENIPUNCTURE: CPT

## 2022-03-09 PROCEDURE — 20610 DRAIN/INJ JOINT/BURSA W/O US: CPT | Performed by: ORTHOPAEDIC SURGERY

## 2022-03-09 RX ORDER — TRIAMCINOLONE ACETONIDE 40 MG/ML
40 INJECTION, SUSPENSION INTRA-ARTICULAR; INTRAMUSCULAR ONCE
Status: COMPLETED | OUTPATIENT
Start: 2022-03-09 | End: 2022-03-09

## 2022-03-09 RX ADMIN — TRIAMCINOLONE ACETONIDE 40 MG: 40 INJECTION, SUSPENSION INTRA-ARTICULAR; INTRAMUSCULAR at 16:25:00

## 2022-03-09 NOTE — PROGRESS NOTES
Patient is a 65-year-old white female here for follow-up on her right knee. Patient had the MRI scan ordered of her right knee. This is a correction from the 2/16/2022 note. Patient's MRI scan was reviewed with the patient shows her to have some chondral injury to the patella and also some chondral injury to the medial femoral condyle. The meniscus pads appear to be intact. Patient continues to have some pain of the knee. Patient is exam shows that have mild to moderate tenderness on the medial joint line. Mild effusion of the knee is noted. Ligaments are stable. Range of motion shows full extension and flexion about 125 degrees. Neurologically intact lower extremities to light touch. Motor exam grossly 5/5. Impression is that of chondromalacia and synovitis of the right knee. Second impression is that of mild degenerative arthritis of the right knee. Recommendation is to go ahead with a cortisone injection which was done under sterile technique with 4 cc Xylocaine and Marcaine and 40 mg of Kenalog. Patient was advised that if this was not effective or only if short duration benefit then I would go ahead with the Synvisc 1 or equivalent lubricant injection. Patient understands. She tolerated the injection well. We will follow-up with her in a month or 2 as needed.

## 2022-04-01 RX ORDER — SUMATRIPTAN 5 MG/1
SPRAY NASAL
Qty: 6 EACH | Refills: 2 | Status: SHIPPED | OUTPATIENT
Start: 2022-04-01

## 2022-04-01 RX ORDER — DEXTROAMPHETAMINE SACCHARATE, AMPHETAMINE ASPARTATE MONOHYDRATE, DEXTROAMPHETAMINE SULFATE AND AMPHETAMINE SULFATE 3.75; 3.75; 3.75; 3.75 MG/1; MG/1; MG/1; MG/1
15 CAPSULE, EXTENDED RELEASE ORAL EVERY MORNING
Qty: 30 CAPSULE | Refills: 0 | Status: SHIPPED | OUTPATIENT
Start: 2022-04-01

## 2022-04-12 ENCOUNTER — TELEPHONE (OUTPATIENT)
Dept: NEUROLOGY | Facility: CLINIC | Age: 42
End: 2022-04-12

## 2022-04-12 NOTE — TELEPHONE ENCOUNTER
[facility] neuropsych eval dated 4/2022. Sent to scanning on 4/12/22.   Placed in Dr. Tristan Sepulveda for review

## 2022-04-19 ENCOUNTER — TELEPHONE (OUTPATIENT)
Dept: NEUROLOGY | Facility: CLINIC | Age: 42
End: 2022-04-19

## 2022-04-19 NOTE — TELEPHONE ENCOUNTER
Received neuropsych report. Date of evaluation is listed as 4/28/22; no referring physician listed on report. Patient previously seen by Dr. Avelino Gold and BILLIE Hobbs. Faxed report to [facility] at [phone].

## 2022-04-19 NOTE — TELEPHONE ENCOUNTER
[facility]  DOS:  4/28/22  Neuropsychological Evaluation received    Placed copy in Dr. Darren Knight bin  Copy to scanning

## 2022-05-09 ENCOUNTER — OFFICE VISIT (OUTPATIENT)
Dept: NEUROLOGY | Facility: CLINIC | Age: 42
End: 2022-05-09
Payer: COMMERCIAL

## 2022-05-09 VITALS
HEIGHT: 68 IN | WEIGHT: 293 LBS | DIASTOLIC BLOOD PRESSURE: 88 MMHG | SYSTOLIC BLOOD PRESSURE: 136 MMHG | HEART RATE: 88 BPM | RESPIRATION RATE: 16 BRPM | BODY MASS INDEX: 44.41 KG/M2

## 2022-05-09 DIAGNOSIS — G43.009 MIGRAINE WITHOUT AURA AND WITHOUT STATUS MIGRAINOSUS, NOT INTRACTABLE: Primary | ICD-10-CM

## 2022-05-09 DIAGNOSIS — F98.8 ATTENTION DEFICIT DISORDER (ADD) WITHOUT HYPERACTIVITY: ICD-10-CM

## 2022-05-09 DIAGNOSIS — E53.8 VITAMIN B12 DEFICIENCY: ICD-10-CM

## 2022-05-09 PROCEDURE — 3079F DIAST BP 80-89 MM HG: CPT | Performed by: PHYSICIAN ASSISTANT

## 2022-05-09 PROCEDURE — 3075F SYST BP GE 130 - 139MM HG: CPT | Performed by: PHYSICIAN ASSISTANT

## 2022-05-09 PROCEDURE — 3008F BODY MASS INDEX DOCD: CPT | Performed by: PHYSICIAN ASSISTANT

## 2022-05-09 PROCEDURE — 99214 OFFICE O/P EST MOD 30 MIN: CPT | Performed by: PHYSICIAN ASSISTANT

## 2022-05-09 RX ORDER — SUMATRIPTAN 5 MG/1
SPRAY NASAL
Qty: 18 EACH | Refills: 3 | Status: SHIPPED | OUTPATIENT
Start: 2022-05-09

## 2022-05-09 RX ORDER — ERENUMAB-AOOE 70 MG/ML
70 INJECTION SUBCUTANEOUS
Qty: 3 ML | Refills: 1 | Status: SHIPPED | OUTPATIENT
Start: 2022-05-09 | End: 2022-11-05

## 2022-05-09 NOTE — TELEPHONE ENCOUNTER
Pt in the office today can we transferred her Adderall to [facility]   Her [place] pharmacy is out of stock

## 2022-05-13 ENCOUNTER — OFFICE VISIT (OUTPATIENT)
Dept: ORTHOPEDICS CLINIC | Facility: CLINIC | Age: 42
End: 2022-05-13
Payer: COMMERCIAL

## 2022-05-13 VITALS — BODY MASS INDEX: 44.41 KG/M2 | HEIGHT: 68 IN | WEIGHT: 293 LBS

## 2022-05-13 DIAGNOSIS — M17.31 POST-TRAUMATIC OSTEOARTHRITIS OF RIGHT KNEE: Primary | ICD-10-CM

## 2022-05-13 PROCEDURE — 3008F BODY MASS INDEX DOCD: CPT | Performed by: ORTHOPAEDIC SURGERY

## 2022-05-13 PROCEDURE — 20610 DRAIN/INJ JOINT/BURSA W/O US: CPT | Performed by: ORTHOPAEDIC SURGERY

## 2022-05-13 PROCEDURE — 99212 OFFICE O/P EST SF 10 MIN: CPT | Performed by: ORTHOPAEDIC SURGERY

## 2022-05-13 RX ORDER — TRIAMCINOLONE ACETONIDE 40 MG/ML
40 INJECTION, SUSPENSION INTRA-ARTICULAR; INTRAMUSCULAR ONCE
Status: COMPLETED | OUTPATIENT
Start: 2022-05-13 | End: 2022-05-13

## 2022-05-13 RX ORDER — SEMAGLUTIDE 2.4 MG/.75ML
INJECTION, SOLUTION SUBCUTANEOUS
COMMUNITY
Start: 2022-03-31

## 2022-05-13 RX ADMIN — TRIAMCINOLONE ACETONIDE 40 MG: 40 INJECTION, SUSPENSION INTRA-ARTICULAR; INTRAMUSCULAR at 08:42:00

## 2022-05-13 NOTE — PROGRESS NOTES
Patient is a 27-year-old white female with persistent right knee pain. Patient has had a diagnosis of chondromalacia and early arthritis of her right knee. Patient has had cortisone injections in the past which have been helpful. She is going on a trip here in a few weeks and I advised considering a cortisone injection prior to the trip. Patient also was advised to consider the lubricant injections but I feel that it is more appropriate for the cortisone today and then go ahead and request authorization for the lubricant injections after she comes back if she is having recurrent pain. Patient's exam shows that have tenderness on the medial joint line of the right knee. Mild crepitation patellofemoral joint. Mild effusion of the knee. Ligaments are stable. Range of motion shows full extension flexion about 130 degrees. Impression is that of degenerative arthritis right knee. Second pression synovitis right knee. Third impression chondromalacia patellofemoral joint right knee. Recommendations are to go ahead with a cortisone injection today. This was done under sterile technique through an anteromedial approach with 4 cc Xylocaine and Marcaine and 40 mg of Kenalog. Advised going ahead with a request for authorization for the Synvisc 1 injections. We will try those when she comes back if she is having persistent pain. Patient also might benefit from an arthroscopy of her knee for chondroplasty however presently will hold off on that.

## 2022-06-27 ENCOUNTER — TELEPHONE (OUTPATIENT)
Dept: GASTROENTEROLOGY | Age: 42
End: 2022-06-27

## 2022-06-27 ENCOUNTER — TELEPHONE (OUTPATIENT)
Dept: ORTHOPEDICS CLINIC | Facility: CLINIC | Age: 42
End: 2022-06-27

## 2022-06-27 NOTE — TELEPHONE ENCOUNTER
Patient called and would like to know if we have heard anything regarding Synvisc 1 Injection that Dr. Zana Ann said he would get started for her.  Please advise

## 2022-06-28 RX ORDER — OMEPRAZOLE 40 MG/1
40 CAPSULE, DELAYED RELEASE ORAL 2 TIMES DAILY
Qty: 180 CAPSULE | Refills: 0 | Status: SHIPPED | OUTPATIENT
Start: 2022-06-28 | End: 2022-10-25

## 2022-06-28 NOTE — TELEPHONE ENCOUNTER
Spoke to patient and notified her that visco is not a covered benefit under her plan. She did make an appointment for another steroid vs surgical discussion appt.  Patient verbalized understanding    Future Appointments   Date Time Provider Heavenly Langley   7/27/2022  2:00 PM Javier Carrillo MD [facility]

## 2022-07-11 DIAGNOSIS — F98.8 ATTENTION DEFICIT DISORDER (ADD) WITHOUT HYPERACTIVITY: ICD-10-CM

## 2022-07-12 RX ORDER — DEXTROAMPHETAMINE SACCHARATE, AMPHETAMINE ASPARTATE MONOHYDRATE, DEXTROAMPHETAMINE SULFATE AND AMPHETAMINE SULFATE 3.75; 3.75; 3.75; 3.75 MG/1; MG/1; MG/1; MG/1
15 CAPSULE, EXTENDED RELEASE ORAL EVERY MORNING
Qty: 30 CAPSULE | Refills: 0 | Status: SHIPPED | OUTPATIENT
Start: 2022-07-12

## 2022-07-27 ENCOUNTER — OFFICE VISIT (OUTPATIENT)
Dept: ORTHOPEDICS CLINIC | Facility: CLINIC | Age: 42
End: 2022-07-27
Payer: COMMERCIAL

## 2022-07-27 DIAGNOSIS — M22.41 PATELLA, CHONDROMALACIA, RIGHT: ICD-10-CM

## 2022-07-27 DIAGNOSIS — S83.231D COMPLEX TEAR OF MEDIAL MENISCUS OF RIGHT KNEE AS CURRENT INJURY, SUBSEQUENT ENCOUNTER: ICD-10-CM

## 2022-07-27 DIAGNOSIS — M17.31 POST-TRAUMATIC OSTEOARTHRITIS OF RIGHT KNEE: Primary | ICD-10-CM

## 2022-07-27 PROCEDURE — 99213 OFFICE O/P EST LOW 20 MIN: CPT | Performed by: ORTHOPAEDIC SURGERY

## 2022-07-28 ENCOUNTER — TELEPHONE (OUTPATIENT)
Dept: ORTHOPEDICS CLINIC | Facility: CLINIC | Age: 42
End: 2022-07-28

## 2022-07-28 NOTE — TELEPHONE ENCOUNTER
Surgeon: Dr. Ellis Nielson    Date of Surgery: 8/22/22       Post Op Appt: 8/23/22 @1PM    Prior Authorization:   Inpatient or Outpatient: Outpatient  Facility: [facility]  Work Comp: NO  CPT: ***  DX: ***    Surgical Assistant: Vickey Dinero    Preadmission Testing: PER ANESTHESIA GUIDELINES     Pre-Op Clearance Requested: HISTORY AND PHYSICAL or MEDICAL CLEARANCE    DME:  NONE NEEDED    Rehab Services Ordered: NONE     Disability Paperwork: NONE    On [place] Calendar: YES    Notes: RIGHT KNEE ARTHROSCOPY WITH CHONDROPLASTY AND POSSIBLE LATERAL RELEASE.

## 2022-07-29 NOTE — TELEPHONE ENCOUNTER
Surgeon: Dr. Nixon Blanchard    Date of Surgery: 8/22       Post Op Appt: 8/23 @  1PM     Prior Authorization:   Inpatient or Outpatient: Outpatient  Facility: [facility]  Work Comp: NO  CPT: 60196,63503  DX: M22.41, S83.231D    Surgical Assistant: Tommy Art     Preadmission Testing: PER ANESTHESIA GUIDELINES     Pre-Op Clearance Requested: HISTORY AND PHYSICAL or MEDICAL CLEARANCE    DME:  NONE NEEDED    Rehab Services Ordered: NONE     Disability Paperwork: NONE    On [place] Calendar: YES    Notes: RT KNEE ARTHROSCOPY CHONDROPLASTY, PMM.  FAXED TO [facility]

## 2022-07-31 NOTE — PROGRESS NOTES
Patient is a 22-year-old white female here for follow-up on her right knee. Patient continues to have pain of this knee and the last injection was not very effective for relief of pain. Patient has had a prior diagnosis of some early degenerative arthritis of the knee as well as a complex tear of the medial meniscus and some chondromalacia of the patellofemoral joint. Patient's exam shows her to have tenderness along the medial aspect of the right knee. She has a mild effusion of the knee. Mild crepitation patellofemoral joint. Gait is mildly antalgic. Ligaments are stable. Range of motion shows essentially full extension and flexion to about 130 degrees. Impression is that of medial meniscus tear complex degenerative in nature right knee. Second impression is that of degenerative arthritis of the right knee. Third impression chondromalacia patellofemoral joint right knee. Recommendations are to go ahead with an arthroscopy of the right knee. In light of the persistent symptoms and difficulties in performing daily activities I have advised going ahead with the procedure. She understands however that I cannot guarantee that she will have complete resolution of her pain and most likely she will have some residual discomfort depending upon the intensity of her activities. Patient might benefit post arthroscopy with a lubricant injection and we can discuss that in the future. Risks and benefits of surgery include bleeding, infection, residual adhesions and potential residual pain. Patient would like to proceed.

## 2022-08-01 ENCOUNTER — TELEPHONE (OUTPATIENT)
Dept: GASTROENTEROLOGY | Age: 42
End: 2022-08-01

## 2022-08-01 ENCOUNTER — MED REC SCAN ONLY (OUTPATIENT)
Dept: ORTHOPEDICS CLINIC | Facility: CLINIC | Age: 42
End: 2022-08-01

## 2022-08-01 DIAGNOSIS — K44.9 HIATAL HERNIA: ICD-10-CM

## 2022-08-01 DIAGNOSIS — K21.9 GERD WITHOUT ESOPHAGITIS: Primary | ICD-10-CM

## 2022-08-05 ENCOUNTER — E-ADVICE (OUTPATIENT)
Dept: GASTROENTEROLOGY | Age: 42
End: 2022-08-05

## 2022-08-05 ENCOUNTER — TELEPHONE (OUTPATIENT)
Dept: ORTHOPEDICS CLINIC | Facility: CLINIC | Age: 42
End: 2022-08-05

## 2022-08-05 ENCOUNTER — PATIENT MESSAGE (OUTPATIENT)
Dept: ORTHOPEDICS CLINIC | Facility: CLINIC | Age: 42
End: 2022-08-05

## 2022-08-05 DIAGNOSIS — K21.9 GERD WITHOUT ESOPHAGITIS: Primary | ICD-10-CM

## 2022-08-05 NOTE — TELEPHONE ENCOUNTER
Patient lost folder with all surgical information in it and is requesting the phone number to [facility] pre admission testing.

## 2022-08-22 ENCOUNTER — TELEPHONE (OUTPATIENT)
Dept: ORTHOPEDICS CLINIC | Facility: CLINIC | Age: 42
End: 2022-08-22

## 2022-08-22 RX ORDER — HYDROCODONE BITARTRATE AND ACETAMINOPHEN 5; 325 MG/1; MG/1
1 TABLET ORAL EVERY 4 HOURS PRN
Qty: 30 TABLET | Refills: 0 | Status: SHIPPED | OUTPATIENT
Start: 2022-08-22

## 2022-08-22 RX ORDER — ONDANSETRON 4 MG/1
4 TABLET, FILM COATED ORAL EVERY 8 HOURS PRN
Qty: 15 TABLET | Refills: 0 | Status: SHIPPED | OUTPATIENT
Start: 2022-08-22

## 2022-08-22 NOTE — TELEPHONE ENCOUNTER
Joshua Nelson PA-C  You 27 minutes ago (12:21 PM)     It says in her chart she has tolerated the Norco in the past :) thank you! Message text      Verified voicemail reached with verbal consent signed. Attempted to call Carl Manning regarding clarification of allergy list and medicatons ordered. Reached voicemail, left voicemail and provided a detail message with my call back contact information.

## 2022-08-22 NOTE — TELEPHONE ENCOUNTER
Verified voicemail reached with verbal consent signed. Attempted to call Milton Hernandez regarding notification Zofran was sent to her pharmacy on file. Reached voicemail, left voicemail and provided a detail message with my call back contact information.

## 2022-08-22 NOTE — TELEPHONE ENCOUNTER
Adis Garcia from [address] called [facility] said that patient is allergic to Acetaminophen. Please advise. Thanks.     Patient can be reached at [phone]

## 2022-08-22 NOTE — TELEPHONE ENCOUNTER
Spoke with Bia Krishnan, pt's mother, TOVA signed, who stated that pt was able to  the script for Norco. Pt was in the background, and asked if the allergy for Tylenol could be removed, as pt has taken and tolerated Tylenol and Norco in the past without incident. Allergy removed, per pt's request. Pt's mother stated pt was experiencing an increased amount of nausea post op. Pt's mother notified to have pt keep up with fluids, and bland foods today. Pt's mother asked if a small script for Zofran ODT could be sent to the pharmacy on file. Request will be sent to [facility] [id number] for review.

## 2022-08-23 ENCOUNTER — OFFICE VISIT (OUTPATIENT)
Dept: ORTHOPEDICS CLINIC | Facility: CLINIC | Age: 42
End: 2022-08-23
Payer: COMMERCIAL

## 2022-08-23 ENCOUNTER — ORDER TRANSCRIPTION (OUTPATIENT)
Dept: PHYSICAL THERAPY | Facility: HOSPITAL | Age: 42
End: 2022-08-23

## 2022-08-23 VITALS — HEIGHT: 68 IN | WEIGHT: 293 LBS | BODY MASS INDEX: 44.41 KG/M2

## 2022-08-23 DIAGNOSIS — Z48.89 AFTERCARE FOLLOWING SURGERY: Primary | ICD-10-CM

## 2022-08-23 PROCEDURE — 99024 POSTOP FOLLOW-UP VISIT: CPT | Performed by: ORTHOPAEDIC SURGERY

## 2022-08-23 PROCEDURE — 3008F BODY MASS INDEX DOCD: CPT | Performed by: ORTHOPAEDIC SURGERY

## 2022-08-25 ENCOUNTER — TELEPHONE (OUTPATIENT)
Dept: ORTHOPEDICS CLINIC | Facility: CLINIC | Age: 42
End: 2022-08-25

## 2022-08-25 NOTE — TELEPHONE ENCOUNTER
Patient wants to know what she can put under the band aids, any ointments or antibiotics. Advised patient to keep site intact, no lotion or antibiotic cream. Patient verbalized understanding.

## 2022-08-26 ENCOUNTER — E-ADVICE (OUTPATIENT)
Dept: GASTROENTEROLOGY | Age: 42
End: 2022-08-26

## 2022-08-31 ENCOUNTER — TELEPHONE (OUTPATIENT)
Dept: PHYSICAL THERAPY | Facility: HOSPITAL | Age: 42
End: 2022-08-31

## 2022-08-31 ENCOUNTER — OFFICE VISIT (OUTPATIENT)
Dept: ORTHOPEDICS CLINIC | Facility: CLINIC | Age: 42
End: 2022-08-31
Payer: COMMERCIAL

## 2022-08-31 VITALS — WEIGHT: 293 LBS | BODY MASS INDEX: 44.41 KG/M2 | HEIGHT: 68 IN

## 2022-08-31 DIAGNOSIS — Z48.89 AFTERCARE FOLLOWING SURGERY: Primary | ICD-10-CM

## 2022-08-31 PROCEDURE — 3008F BODY MASS INDEX DOCD: CPT | Performed by: ORTHOPAEDIC SURGERY

## 2022-08-31 PROCEDURE — 99024 POSTOP FOLLOW-UP VISIT: CPT | Performed by: ORTHOPAEDIC SURGERY

## 2022-08-31 NOTE — PROGRESS NOTES
Patient is 1 week postop arthroscopy and endoscopic surgery of her right knee. Patient had the chondroplasty of the patella and trochlea along with the medial femoral condyle. Patient is here for follow-up complaining of mild pain. Patient's exam shows that have a minimally antalgic gait. She has some diffuse ecchymosis of the right knee. Surgical incisions are healing well. Sutures in place. Straight leg raising is intact. Flexions about 110 degrees. Impression is that of progressive healing right knee status post arthroscopy with endoscopic surgery. Recommendations are for sutures to be removed today. Begin physical therapy. Follow-up with me in around 3 weeks time. Anticipate return to work in about 3 weeks to 4 weeks time. Reevaluate for return at her next visit.

## 2022-09-01 ENCOUNTER — APPOINTMENT (OUTPATIENT)
Dept: PHYSICAL THERAPY | Age: 42
End: 2022-09-01
Attending: ORTHOPAEDIC SURGERY
Payer: COMMERCIAL

## 2022-09-02 ENCOUNTER — E-ADVICE (OUTPATIENT)
Dept: GASTROENTEROLOGY | Age: 42
End: 2022-09-02

## 2022-09-06 DIAGNOSIS — F98.8 ATTENTION DEFICIT DISORDER (ADD) WITHOUT HYPERACTIVITY: ICD-10-CM

## 2022-09-07 RX ORDER — SUMATRIPTAN 5 MG/1
SPRAY NASAL
Qty: 18 EACH | Refills: 3 | Status: SHIPPED | OUTPATIENT
Start: 2022-09-07

## 2022-09-07 RX ORDER — DEXTROAMPHETAMINE SACCHARATE, AMPHETAMINE ASPARTATE MONOHYDRATE, DEXTROAMPHETAMINE SULFATE AND AMPHETAMINE SULFATE 3.75; 3.75; 3.75; 3.75 MG/1; MG/1; MG/1; MG/1
15 CAPSULE, EXTENDED RELEASE ORAL EVERY MORNING
Qty: 30 CAPSULE | Refills: 0 | Status: SHIPPED | OUTPATIENT
Start: 2022-09-07

## 2022-09-07 NOTE — TELEPHONE ENCOUNTER
Last Rx sent to [facility], order requested for local [facility]. LM on pt preferred number regarding clarification to which pharmacy is preferred.   [employer] msg sent to pt

## 2022-09-09 ENCOUNTER — APPOINTMENT (OUTPATIENT)
Dept: PHYSICAL THERAPY | Age: 42
End: 2022-09-09
Attending: ORTHOPAEDIC SURGERY
Payer: COMMERCIAL

## 2022-09-09 ENCOUNTER — MED REC SCAN ONLY (OUTPATIENT)
Dept: ORTHOPEDICS CLINIC | Facility: CLINIC | Age: 42
End: 2022-09-09

## 2022-09-12 ENCOUNTER — PATIENT MESSAGE (OUTPATIENT)
Dept: ORTHOPEDICS CLINIC | Facility: CLINIC | Age: 42
End: 2022-09-12

## 2022-09-12 ENCOUNTER — TELEPHONE (OUTPATIENT)
Dept: ORTHOPEDICS CLINIC | Facility: CLINIC | Age: 42
End: 2022-09-12

## 2022-09-12 NOTE — TELEPHONE ENCOUNTER
The [facility] paperwork received on 9/12/22 . Placed on MA desk and sent patient message about $ 25 fee and TOVA.

## 2022-09-13 ENCOUNTER — APPOINTMENT (OUTPATIENT)
Dept: PHYSICAL THERAPY | Age: 42
End: 2022-09-13
Attending: ORTHOPAEDIC SURGERY
Payer: COMMERCIAL

## 2022-09-15 ENCOUNTER — APPOINTMENT (OUTPATIENT)
Dept: PHYSICAL THERAPY | Age: 42
End: 2022-09-15
Attending: ORTHOPAEDIC SURGERY
Payer: COMMERCIAL

## 2022-09-20 ENCOUNTER — APPOINTMENT (OUTPATIENT)
Dept: PHYSICAL THERAPY | Age: 42
End: 2022-09-20
Attending: ORTHOPAEDIC SURGERY
Payer: COMMERCIAL

## 2022-09-21 ENCOUNTER — OFFICE VISIT (OUTPATIENT)
Dept: ORTHOPEDICS CLINIC | Facility: CLINIC | Age: 42
End: 2022-09-21

## 2022-09-21 DIAGNOSIS — Z48.89 AFTERCARE FOLLOWING SURGERY: Primary | ICD-10-CM

## 2022-09-21 DIAGNOSIS — M17.31 POST-TRAUMATIC OSTEOARTHRITIS OF RIGHT KNEE: ICD-10-CM

## 2022-09-21 PROCEDURE — 99024 POSTOP FOLLOW-UP VISIT: CPT | Performed by: ORTHOPAEDIC SURGERY

## 2022-09-22 ENCOUNTER — APPOINTMENT (OUTPATIENT)
Dept: PHYSICAL THERAPY | Age: 42
End: 2022-09-22
Attending: ORTHOPAEDIC SURGERY
Payer: COMMERCIAL

## 2022-09-25 NOTE — PROGRESS NOTES
Patient is a 61-year-old white female here for follow-up on her right knee. Patient had the arthroscopy of the knee for chondromalacia patella, trochlea and medial femoral condyle. Patient is now approximately 4 weeks out from her surgery. Patient's complained of some stiffness of the knee and mild soreness. Patient is improving. Patient's exam shows her to have well-healing surgical incisions of her right knee. Mild swelling of the knee is noted. Straight leg raising is intact. Negative Homans. Patient has few degrees loss of extension and flexion is to about 120 degrees. Gait is nonantalgic. Impression is that of progressive healing status post arthroscopy of the right knee. 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 Recommendations are for her to go ahead with physical therapy for range of motion and strengthening. Want her to follow-up with me in a month's time. We will go ahead with a Synvisc 1 injection at that time and we will seek preapproval presently.

## 2022-09-27 ENCOUNTER — APPOINTMENT (OUTPATIENT)
Dept: PHYSICAL THERAPY | Age: 42
End: 2022-09-27
Attending: ORTHOPAEDIC SURGERY
Payer: COMMERCIAL

## 2022-09-29 ENCOUNTER — APPOINTMENT (OUTPATIENT)
Dept: PHYSICAL THERAPY | Age: 42
End: 2022-09-29
Attending: ORTHOPAEDIC SURGERY
Payer: COMMERCIAL

## 2022-10-04 ENCOUNTER — APPOINTMENT (OUTPATIENT)
Dept: PHYSICAL THERAPY | Age: 42
End: 2022-10-04
Attending: ORTHOPAEDIC SURGERY
Payer: COMMERCIAL

## 2022-10-06 ENCOUNTER — APPOINTMENT (OUTPATIENT)
Dept: PHYSICAL THERAPY | Age: 42
End: 2022-10-06
Attending: ORTHOPAEDIC SURGERY
Payer: COMMERCIAL

## 2022-10-06 RX ORDER — CYANOCOBALAMIN 1000 UG/ML
1000 INJECTION INTRAMUSCULAR; SUBCUTANEOUS
Qty: 1 ML | Refills: 5 | Status: SHIPPED | OUTPATIENT
Start: 2022-10-06

## 2022-10-11 ENCOUNTER — APPOINTMENT (OUTPATIENT)
Dept: PHYSICAL THERAPY | Age: 42
End: 2022-10-11
Attending: ORTHOPAEDIC SURGERY
Payer: COMMERCIAL

## 2022-10-13 ENCOUNTER — APPOINTMENT (OUTPATIENT)
Dept: PHYSICAL THERAPY | Age: 42
End: 2022-10-13
Attending: ORTHOPAEDIC SURGERY
Payer: COMMERCIAL

## 2022-10-25 RX ORDER — OMEPRAZOLE 40 MG/1
CAPSULE, DELAYED RELEASE ORAL
Qty: 60 CAPSULE | Refills: 1 | Status: SHIPPED | OUTPATIENT
Start: 2022-10-25 | End: 2022-12-20 | Stop reason: SDUPTHER

## 2022-10-26 ENCOUNTER — OFFICE VISIT (OUTPATIENT)
Dept: ORTHOPEDICS CLINIC | Facility: CLINIC | Age: 42
End: 2022-10-26
Payer: COMMERCIAL

## 2022-10-26 VITALS — BODY MASS INDEX: 44.41 KG/M2 | HEIGHT: 68 IN | WEIGHT: 293 LBS

## 2022-10-26 DIAGNOSIS — M22.41 PATELLA, CHONDROMALACIA, RIGHT: ICD-10-CM

## 2022-10-26 DIAGNOSIS — M17.31 POST-TRAUMATIC OSTEOARTHRITIS OF RIGHT KNEE: Primary | ICD-10-CM

## 2022-10-26 DIAGNOSIS — F98.8 ATTENTION DEFICIT DISORDER (ADD) WITHOUT HYPERACTIVITY: ICD-10-CM

## 2022-10-26 PROCEDURE — 3008F BODY MASS INDEX DOCD: CPT | Performed by: ORTHOPAEDIC SURGERY

## 2022-10-26 PROCEDURE — 20610 DRAIN/INJ JOINT/BURSA W/O US: CPT | Performed by: ORTHOPAEDIC SURGERY

## 2022-10-26 PROCEDURE — 99212 OFFICE O/P EST SF 10 MIN: CPT | Performed by: ORTHOPAEDIC SURGERY

## 2022-10-26 RX ORDER — CELECOXIB 200 MG/1
200 CAPSULE ORAL 2 TIMES DAILY
Qty: 30 CAPSULE | Refills: 2 | Status: SHIPPED | OUTPATIENT
Start: 2022-10-26 | End: 2022-10-26

## 2022-10-26 RX ORDER — CELECOXIB 200 MG/1
200 CAPSULE ORAL 2 TIMES DAILY
Qty: 180 CAPSULE | Refills: 0 | Status: SHIPPED | OUTPATIENT
Start: 2022-10-26

## 2022-10-26 RX ORDER — TRIAMCINOLONE ACETONIDE 40 MG/ML
40 INJECTION, SUSPENSION INTRA-ARTICULAR; INTRAMUSCULAR ONCE
Status: COMPLETED | OUTPATIENT
Start: 2022-10-26 | End: 2022-10-26

## 2022-10-26 RX ORDER — DEXTROAMPHETAMINE SACCHARATE, AMPHETAMINE ASPARTATE MONOHYDRATE, DEXTROAMPHETAMINE SULFATE AND AMPHETAMINE SULFATE 3.75; 3.75; 3.75; 3.75 MG/1; MG/1; MG/1; MG/1
15 CAPSULE, EXTENDED RELEASE ORAL EVERY MORNING
Qty: 30 CAPSULE | Refills: 0 | Status: SHIPPED | OUTPATIENT
Start: 2022-10-26

## 2022-10-26 RX ADMIN — TRIAMCINOLONE ACETONIDE 40 MG: 40 INJECTION, SUSPENSION INTRA-ARTICULAR; INTRAMUSCULAR at 08:41:00

## 2022-10-26 NOTE — PROGRESS NOTES
Patient is a 46-year-old white female here for follow-up. Patient had the arthroscopy of her right knee for partial meniscectomy as well as chondroplasty. Patient continues to have pain though of the knee. She did have fairly significant chondromalacia and degenerative early degenerative arthritis of her patellofemoral joint. She also had significant wearing of the medial femoral condyle. Patient's exam shows her to have well-healed surgical incisions. She has full extension perhaps a few degrees of hyperextension of the knee. She has flexion about 125 degrees of the right knee. Ligaments are stable. Mild to moderate crepitation of the patellofemoral joint. Impression is that of degenerative arthritis of the right knee. Recommendation is to consider going ahead with a cortisone injection. He done these in the past and they have been helpful for a limited amount of time. We have considered the lubricant injections however the insurance does not cover this. We will go ahead with a cortisone today which was done under sterile technique with 4 cc Xylocaine and Marcaine 40 mg Kenalog. Patient was advised that if this is not effective and she might need to consider knee replacement surgery however at her age it be best to try to put it off as long as possible. We have also discussed that she might have substantial benefit from losing weight and she is in that process as well. We will also give the patient a physical therapy prescription for strengthening and gait training and range of motion of her right knee. Also will give her a prescription for Celebrex 200 mg twice a day as needed pain.

## 2022-11-07 ENCOUNTER — APPOINTMENT (OUTPATIENT)
Dept: GASTROENTEROLOGY | Age: 42
End: 2022-11-07

## 2022-11-10 ENCOUNTER — MED REC SCAN ONLY (OUTPATIENT)
Dept: ORTHOPEDICS CLINIC | Facility: CLINIC | Age: 42
End: 2022-11-10

## 2022-11-23 DIAGNOSIS — F98.8 ATTENTION DEFICIT DISORDER (ADD) WITHOUT HYPERACTIVITY: ICD-10-CM

## 2022-11-25 RX ORDER — DEXTROAMPHETAMINE SACCHARATE, AMPHETAMINE ASPARTATE MONOHYDRATE, DEXTROAMPHETAMINE SULFATE AND AMPHETAMINE SULFATE 3.75; 3.75; 3.75; 3.75 MG/1; MG/1; MG/1; MG/1
15 CAPSULE, EXTENDED RELEASE ORAL EVERY MORNING
Qty: 30 CAPSULE | Refills: 0 | Status: SHIPPED | OUTPATIENT
Start: 2022-11-25

## 2022-11-25 RX ORDER — CYANOCOBALAMIN 1000 UG/ML
1000 INJECTION, SOLUTION INTRAMUSCULAR; SUBCUTANEOUS
Qty: 10 ML | Refills: 1 | Status: SHIPPED | OUTPATIENT
Start: 2022-11-25

## 2022-12-20 ENCOUNTER — OFFICE VISIT (OUTPATIENT)
Dept: GASTROENTEROLOGY | Age: 42
End: 2022-12-20

## 2022-12-20 VITALS — BODY MASS INDEX: 44.41 KG/M2 | WEIGHT: 293 LBS | HEIGHT: 68 IN

## 2022-12-20 DIAGNOSIS — K29.50 CHRONIC GASTRITIS WITHOUT BLEEDING, UNSPECIFIED GASTRITIS TYPE: ICD-10-CM

## 2022-12-20 DIAGNOSIS — K60.2 ANAL FISSURE: ICD-10-CM

## 2022-12-20 DIAGNOSIS — Z86.010 PERSONAL HISTORY OF COLONIC POLYPS: ICD-10-CM

## 2022-12-20 DIAGNOSIS — K64.9 HEMORRHOIDS, UNSPECIFIED HEMORRHOID TYPE: ICD-10-CM

## 2022-12-20 DIAGNOSIS — K21.9 GERD WITHOUT ESOPHAGITIS: Primary | ICD-10-CM

## 2022-12-20 DIAGNOSIS — K59.00 CONSTIPATION, UNSPECIFIED CONSTIPATION TYPE: ICD-10-CM

## 2022-12-20 PROBLEM — K29.70 GASTRITIS: Status: ACTIVE | Noted: 2022-12-20

## 2022-12-20 PROCEDURE — 99214 OFFICE O/P EST MOD 30 MIN: CPT

## 2022-12-20 RX ORDER — OMEPRAZOLE 40 MG/1
40 CAPSULE, DELAYED RELEASE ORAL 2 TIMES DAILY
Qty: 180 CAPSULE | Refills: 3 | Status: SHIPPED | OUTPATIENT
Start: 2022-12-20

## 2022-12-20 RX ORDER — OMEPRAZOLE 40 MG/1
40 CAPSULE, DELAYED RELEASE ORAL 2 TIMES DAILY
Qty: 60 CAPSULE | Refills: 11 | Status: SHIPPED | OUTPATIENT
Start: 2022-12-20 | End: 2022-12-20 | Stop reason: SDUPTHER

## 2022-12-20 ASSESSMENT — ENCOUNTER SYMPTOMS
COLOR CHANGE: 0
APPETITE CHANGE: 0
CHOKING: 0
DIAPHORESIS: 0
DIZZINESS: 0
CONFUSION: 0
BLOOD IN STOOL: 0
RECTAL PAIN: 0
CONSTIPATION: 0
FEVER: 0
FATIGUE: 0
WEAKNESS: 0
ANAL BLEEDING: 1
DIARRHEA: 0
VOICE CHANGE: 0
ABDOMINAL PAIN: 0
COUGH: 0
TROUBLE SWALLOWING: 0
VOMITING: 0
SHORTNESS OF BREATH: 0
CHILLS: 0
NAUSEA: 0
LIGHT-HEADEDNESS: 0
UNEXPECTED WEIGHT CHANGE: 0
ACTIVITY CHANGE: 0
ABDOMINAL DISTENTION: 0

## 2022-12-28 RX ORDER — OMEPRAZOLE 40 MG/1
CAPSULE, DELAYED RELEASE ORAL
Qty: 60 CAPSULE | Refills: 11 | OUTPATIENT
Start: 2022-12-28

## 2023-01-09 DIAGNOSIS — F98.8 ATTENTION DEFICIT DISORDER (ADD) WITHOUT HYPERACTIVITY: ICD-10-CM

## 2023-01-09 RX ORDER — DEXTROAMPHETAMINE SACCHARATE, AMPHETAMINE ASPARTATE MONOHYDRATE, DEXTROAMPHETAMINE SULFATE AND AMPHETAMINE SULFATE 3.75; 3.75; 3.75; 3.75 MG/1; MG/1; MG/1; MG/1
15 CAPSULE, EXTENDED RELEASE ORAL EVERY MORNING
Qty: 30 CAPSULE | Refills: 0 | Status: SHIPPED | OUTPATIENT
Start: 2023-01-09

## 2023-01-10 ENCOUNTER — PATIENT MESSAGE (OUTPATIENT)
Dept: NEUROLOGY | Facility: CLINIC | Age: 43
End: 2023-01-10

## 2023-01-11 NOTE — TELEPHONE ENCOUNTER
Go ahead and write vyvanse      Current Outpatient Medications:     Amphetamine-Dextroamphet ER 15 MG Oral Capsule SR 24 Hr, Take 1 capsule (15 mg total) by mouth every morning., Disp: 30 capsule, Rfl: 0    cyanocobalamin 1000 MCG/ML Injection Solution, Inject 1 mL (1,000 mcg total) into the muscle every 14 (fourteen) days. , Disp: 10 mL, Rfl: 1    celecoxib (CELEBREX) 200 MG Oral Cap, Take 1 capsule (200 mg total) by mouth in the morning and 1 capsule (200 mg total) before bedtime. , Disp: 180 capsule, Rfl: 0    SUMAtriptan 5 MG/ACT Nasal Solution, 1 actuation in each nostril x 1, may repeat in 2 hours if headache recurs, Disp: 18 each, Rfl: 3    WEGOVY 2.4 MG/0.75ML Subcutaneous Solution Auto-injector, , Disp: , Rfl:     metFORMIN HCl  MG Oral Tablet 24 Hr, , Disp: , Rfl:     BD INSULIN SYRINGE U/F 30G X 1/2\" 1 ML Does not apply Misc, , Disp: , Rfl:     levothyroxine 125 MCG Oral Tab, Take 125 mcg by mouth before breakfast., Disp: , Rfl:     Zolmitriptan 5 MG Oral Tab, may repeat once after 2 hours- ONLY 2 IN 24 HOUR PERIOD MAX DOSE. This is a 30 day supply. , Disp: 24 tablet, Rfl: 3    FLUTICASONE PROPIONATE NA, by Nasal route., Disp: , Rfl:     ergocalciferol 39290 UNITS Oral Cap, Take 1 capsule by mouth once a week., Disp: , Rfl: 1    Omeprazole 40 MG Oral Capsule Delayed Release, Take 40 mg by mouth daily. , Disp: , Rfl:     Spironolactone 50 MG Oral Tab, Take 50 mg by mouth daily. , Disp: , Rfl:

## 2023-01-11 NOTE — TELEPHONE ENCOUNTER
Patient is unable to get her Adderall due to the shortage. Patient would like to substitute Vyvanse for now. Please advise.

## 2023-03-15 ENCOUNTER — OFFICE VISIT (OUTPATIENT)
Dept: NEUROLOGY | Facility: CLINIC | Age: 43
End: 2023-03-15
Payer: COMMERCIAL

## 2023-03-15 VITALS — HEIGHT: 68 IN | WEIGHT: 293 LBS | BODY MASS INDEX: 44.41 KG/M2 | RESPIRATION RATE: 16 BRPM

## 2023-03-15 DIAGNOSIS — G43.009 MIGRAINE WITHOUT AURA AND WITHOUT STATUS MIGRAINOSUS, NOT INTRACTABLE: Primary | ICD-10-CM

## 2023-03-15 DIAGNOSIS — F98.8 ATTENTION DEFICIT DISORDER (ADD) WITHOUT HYPERACTIVITY: ICD-10-CM

## 2023-03-15 PROCEDURE — 99214 OFFICE O/P EST MOD 30 MIN: CPT | Performed by: OTHER

## 2023-03-15 PROCEDURE — 3008F BODY MASS INDEX DOCD: CPT | Performed by: OTHER

## 2023-03-24 ENCOUNTER — OFFICE VISIT (OUTPATIENT)
Dept: FAMILY MEDICINE CLINIC | Facility: CLINIC | Age: 43
End: 2023-03-24
Payer: COMMERCIAL

## 2023-03-24 VITALS
OXYGEN SATURATION: 99 % | HEIGHT: 68 IN | HEART RATE: 89 BPM | TEMPERATURE: 97 F | WEIGHT: 293 LBS | SYSTOLIC BLOOD PRESSURE: 141 MMHG | DIASTOLIC BLOOD PRESSURE: 94 MMHG | RESPIRATION RATE: 20 BRPM | BODY MASS INDEX: 44.41 KG/M2

## 2023-03-24 DIAGNOSIS — R39.9 UTI SYMPTOMS: Primary | ICD-10-CM

## 2023-03-24 PROCEDURE — 3077F SYST BP >= 140 MM HG: CPT | Performed by: PHYSICIAN ASSISTANT

## 2023-03-24 PROCEDURE — 3080F DIAST BP >= 90 MM HG: CPT | Performed by: PHYSICIAN ASSISTANT

## 2023-03-24 PROCEDURE — 87086 URINE CULTURE/COLONY COUNT: CPT | Performed by: PHYSICIAN ASSISTANT

## 2023-03-24 PROCEDURE — 99213 OFFICE O/P EST LOW 20 MIN: CPT | Performed by: PHYSICIAN ASSISTANT

## 2023-03-24 PROCEDURE — 3008F BODY MASS INDEX DOCD: CPT | Performed by: PHYSICIAN ASSISTANT

## 2023-03-24 RX ORDER — NITROFURANTOIN 25; 75 MG/1; MG/1
100 CAPSULE ORAL 2 TIMES DAILY
Qty: 14 CAPSULE | Refills: 0 | Status: SHIPPED | OUTPATIENT
Start: 2023-03-24 | End: 2023-03-31

## 2023-03-24 NOTE — PATIENT INSTRUCTIONS
-Push fluids- gatorade, water, cranberry juice.  -Will call in 1-3 days with urine culture results  -If have increased urinary urgency, urinary frequency, blood in urine, fevers, chills, sweats, back pain, or abdominal pain, please seek medical care immediately. What can you do to help prevent bladder infections? Urinate often during the day. You should also urinate after you have sex. If you are a woman, it is important to:   Keep the area around your vagina clean. Wipe from front to back after you go to the bathroom. Gently wash the area around your vagina when you bathe or shower. Wear cotton underwear. Use pantyhose with cotton crotches. Avoid tight clothing. Wear loose pants. Do not wear a wet bathing suit for a long time.

## 2023-04-26 ENCOUNTER — TELEPHONE (OUTPATIENT)
Dept: NEUROLOGY | Facility: CLINIC | Age: 43
End: 2023-04-26

## 2023-04-26 NOTE — TELEPHONE ENCOUNTER
PA requested for Aimovig  Clinical questions answered and submitted to insurance  Awaiting coverage determination    Pt notified

## 2023-04-28 NOTE — TELEPHONE ENCOUNTER
Received fax from [address] [place] [address] received for patient use of Aimovig 70mg/mL   Approval granted: 3/27/23-4/26/24        Pt notified

## 2023-05-09 ENCOUNTER — TELEPHONE (OUTPATIENT)
Dept: ORTHOPEDICS CLINIC | Facility: CLINIC | Age: 43
End: 2023-05-09

## 2023-05-10 ENCOUNTER — PATIENT MESSAGE (OUTPATIENT)
Dept: ORTHOPEDICS CLINIC | Facility: CLINIC | Age: 43
End: 2023-05-10

## 2023-05-10 NOTE — TELEPHONE ENCOUNTER
LOV 10/26/22  Upcoming visit for follow up and possible cortisone inj: 5/12/23    Last Xray over 1 year ago, Jan 2022. New orders were placed. Last MRI 2/23/23. Pt questioning if xray necessary. Do you want xray repeated? Thank you! Noted that surgery may be needed in future but dr trying to hold off due to pts age.

## 2023-05-10 NOTE — TELEPHONE ENCOUNTER
From: Joseph Davis  To: Omar Hardin MD  Sent: 5/10/2023 10:58 AM CDT  Subject: Tj Hannah? Why was this ordered    I received a notice about an X-ray schedule? No one has contacted me and [employer] is not the best way to reach me or to schedule any tests for me. I tried to call your office but just end up in a [employer] system and it never connects me to anyone. Please call me at [phone] so I can be informed as to what is going on.      If an X-ray is required prior to cortisone shot that is okay but I have had them in the past and previously had x-ray, MRI, and surgery

## 2023-05-10 NOTE — TELEPHONE ENCOUNTER
Spoke with Leeanne Price and Dr. Suyapa De La Fuente and we do not need the xrays at this time. I have informed patient as well. Thanks!

## 2023-05-12 ENCOUNTER — OFFICE VISIT (OUTPATIENT)
Dept: ORTHOPEDICS CLINIC | Facility: CLINIC | Age: 43
End: 2023-05-12
Payer: COMMERCIAL

## 2023-05-12 VITALS — BODY MASS INDEX: 44.41 KG/M2 | HEIGHT: 68 IN | WEIGHT: 293 LBS

## 2023-05-12 DIAGNOSIS — Z01.89 ENCOUNTER FOR LOWER EXTREMITY COMPARISON IMAGING STUDY: ICD-10-CM

## 2023-05-12 DIAGNOSIS — M17.31 POST-TRAUMATIC OSTEOARTHRITIS OF RIGHT KNEE: Primary | ICD-10-CM

## 2023-05-12 RX ORDER — TRIAMCINOLONE ACETONIDE 40 MG/ML
40 INJECTION, SUSPENSION INTRA-ARTICULAR; INTRAMUSCULAR ONCE
Status: COMPLETED | OUTPATIENT
Start: 2023-05-12 | End: 2023-05-12

## 2023-05-12 RX ADMIN — TRIAMCINOLONE ACETONIDE 40 MG: 40 INJECTION, SUSPENSION INTRA-ARTICULAR; INTRAMUSCULAR at 08:58:00

## 2023-05-12 NOTE — PROGRESS NOTES
Patient is a 46-year-old white female here for follow-up. Patient has had a diagnosis of degenerative arthritis of her right knee. Patient is requesting a repeat cortisone injection. Patient's exam shows have mild swelling of the right knee. Tenderness along the medial aspect of the knee. Minimal varus alignment of the knee. Patient has full extension flexion about 130 degrees. Impression is that of degenerative arthritis and synovitis of the right knee. Recommendations to go ahead with a cortisone injection which was done under sterile technique through an anteromedial approach with 4 cc of Xylocaine and Marcaine and 40 mg of Kenalog. Patient tolerated the injection well. No complications. Patient will follow-up as needed.

## 2023-07-13 NOTE — TELEPHONE ENCOUNTER
Medication: Lisdexamfetamine Dimesylate (VYVANSE) 40 MG      Date of last refill: 6/12/23 (#30/0R)  [place] [birth year]     Last office visit: 3/15/23  Due back to clinic per last office note:  Keyana Amend  Date next office visit scheduled:    Future Appointments   Date Time Provider Heavenly Langley   9/20/2023  1:20 PM MD COLBY Quan [facility]           Last OV note recommendation:    Problem/s Identified this visit:   Migraine without aura and without status migrainosus, not intractable  (primary encounter diagnosis)  Attention deficit disorder (ADD) without hyperactivity        Discussion plus Diagnostics & Treatment Orders:  Orders Placed This Encounter      Lisdexamfetamine Dimesylate (VYVANSE) 40 MG Oral Cap          Sig: Take 1 capsule (40 mg total) by mouth every morning.           Dispense:  30 capsule          Refill:  0

## 2023-07-19 ENCOUNTER — OFFICE VISIT (OUTPATIENT)
Dept: NEUROLOGY | Facility: CLINIC | Age: 43
End: 2023-07-19
Payer: COMMERCIAL

## 2023-07-19 VITALS
HEART RATE: 78 BPM | BODY MASS INDEX: 44.41 KG/M2 | WEIGHT: 293 LBS | DIASTOLIC BLOOD PRESSURE: 72 MMHG | HEIGHT: 68 IN | RESPIRATION RATE: 16 BRPM | SYSTOLIC BLOOD PRESSURE: 124 MMHG

## 2023-07-19 DIAGNOSIS — G43.009 MIGRAINE WITHOUT AURA AND WITHOUT STATUS MIGRAINOSUS, NOT INTRACTABLE: Primary | ICD-10-CM

## 2023-07-19 DIAGNOSIS — G56.02 CARPAL TUNNEL SYNDROME OF LEFT WRIST: ICD-10-CM

## 2023-07-19 DIAGNOSIS — M77.8 SHOULDER TENDINITIS, LEFT: ICD-10-CM

## 2023-07-19 PROCEDURE — 3074F SYST BP LT 130 MM HG: CPT | Performed by: OTHER

## 2023-07-19 PROCEDURE — 99214 OFFICE O/P EST MOD 30 MIN: CPT | Performed by: OTHER

## 2023-07-19 PROCEDURE — 3008F BODY MASS INDEX DOCD: CPT | Performed by: OTHER

## 2023-07-19 PROCEDURE — 3078F DIAST BP <80 MM HG: CPT | Performed by: OTHER

## 2023-07-19 RX ORDER — METHYLPREDNISOLONE 4 MG/1
TABLET ORAL
Qty: 1 EACH | Refills: 0 | Status: SHIPPED | OUTPATIENT
Start: 2023-07-19

## 2023-07-19 RX ORDER — SUMATRIPTAN 5 MG/1
SPRAY NASAL
Qty: 18 EACH | Refills: 3 | Status: SHIPPED | OUTPATIENT
Start: 2023-07-19

## 2023-08-07 ENCOUNTER — OFFICE VISIT (OUTPATIENT)
Dept: FAMILY MEDICINE CLINIC | Facility: CLINIC | Age: 43
End: 2023-08-07
Payer: COMMERCIAL

## 2023-08-07 VITALS
WEIGHT: 293 LBS | DIASTOLIC BLOOD PRESSURE: 88 MMHG | RESPIRATION RATE: 18 BRPM | TEMPERATURE: 98 F | OXYGEN SATURATION: 98 % | SYSTOLIC BLOOD PRESSURE: 128 MMHG | HEIGHT: 68 IN | HEART RATE: 91 BPM | BODY MASS INDEX: 44.41 KG/M2

## 2023-08-07 DIAGNOSIS — J02.9 SORE THROAT: Primary | ICD-10-CM

## 2023-08-07 LAB
CONTROL LINE PRESENT WITH A CLEAR BACKGROUND (YES/NO): YES YES/NO
KIT LOT #: NORMAL NUMERIC
OPERATOR ID: NORMAL
POCT LOT NUMBER: NORMAL
RAPID SARS-COV-2 BY PCR: NOT DETECTED

## 2023-08-07 PROCEDURE — 87081 CULTURE SCREEN ONLY: CPT | Performed by: NURSE PRACTITIONER

## 2023-09-18 DIAGNOSIS — F98.8 ATTENTION DEFICIT DISORDER (ADD) WITHOUT HYPERACTIVITY: ICD-10-CM

## 2023-09-20 ENCOUNTER — OFFICE VISIT (OUTPATIENT)
Dept: NEUROLOGY | Facility: CLINIC | Age: 43
End: 2023-09-20
Payer: COMMERCIAL

## 2023-09-20 VITALS — WEIGHT: 293 LBS | HEIGHT: 68 IN | BODY MASS INDEX: 44.41 KG/M2 | RESPIRATION RATE: 16 BRPM

## 2023-09-20 DIAGNOSIS — M77.8 SHOULDER TENDINITIS, LEFT: ICD-10-CM

## 2023-09-20 DIAGNOSIS — F98.8 ATTENTION DEFICIT DISORDER (ADD) WITHOUT HYPERACTIVITY: ICD-10-CM

## 2023-09-20 DIAGNOSIS — G43.009 MIGRAINE WITHOUT AURA AND WITHOUT STATUS MIGRAINOSUS, NOT INTRACTABLE: Primary | ICD-10-CM

## 2023-09-20 PROCEDURE — 3008F BODY MASS INDEX DOCD: CPT | Performed by: OTHER

## 2023-09-20 PROCEDURE — 99214 OFFICE O/P EST MOD 30 MIN: CPT | Performed by: OTHER

## 2023-09-20 RX ORDER — ZONISAMIDE 25 MG/1
25 CAPSULE ORAL 2 TIMES DAILY
Qty: 60 CAPSULE | Refills: 5 | Status: SHIPPED | OUTPATIENT
Start: 2023-09-20

## 2023-09-20 RX ORDER — ELETRIPTAN HYDROBROMIDE 40 MG/1
TABLET, FILM COATED ORAL
Qty: 8 TABLET | Refills: 5 | Status: SHIPPED | OUTPATIENT
Start: 2023-09-20

## 2023-10-24 DIAGNOSIS — F98.8 ATTENTION DEFICIT DISORDER (ADD) WITHOUT HYPERACTIVITY: ICD-10-CM

## 2023-10-25 RX ORDER — LISDEXAMFETAMINE DIMESYLATE CAPSULES 50 MG/1
50 CAPSULE ORAL EVERY MORNING
Qty: 30 CAPSULE | Refills: 0 | Status: SHIPPED | OUTPATIENT
Start: 2023-10-25

## 2023-10-25 NOTE — TELEPHONE ENCOUNTER
Medication: Vyvanse 50 mg     Date of last refill: 09/18/2023  [place] 20[place]64/2332     Last office visit: 09/20/2023  Due back to clinic per last office note:    Date next office visit scheduled:    Future Appointments   Date Time Provider Heavenly Langley   3/20/2024  1:20 PM MD COLBY Cates [facility]           Last OV note recommendation:    Discussion plus Diagnostics & Treatment Orders: We are going to try a longer acting triptan and see how it will work with her headaches. In the meantime start zonisamide as a preventative. Monitor effects. She will report back to me in 2 to 3 months whether it is improving or she is responding at which time if nothing works we can transition to CGRP blockers        (x) Discussed potential side effects of any treatment relevant to above. Includes explanation of tests as necessary. Return in about 6 months (around 3/20/2024).

## 2023-11-16 DIAGNOSIS — F98.8 ATTENTION DEFICIT DISORDER (ADD) WITHOUT HYPERACTIVITY: ICD-10-CM

## 2023-11-16 RX ORDER — ELETRIPTAN HYDROBROMIDE 40 MG/1
TABLET, FILM COATED ORAL
Qty: 24 TABLET | Refills: 1 | Status: SHIPPED | OUTPATIENT
Start: 2023-11-16

## 2023-11-16 RX ORDER — LISDEXAMFETAMINE DIMESYLATE CAPSULES 50 MG/1
50 CAPSULE ORAL EVERY MORNING
Qty: 30 CAPSULE | Refills: 0 | Status: SHIPPED | OUTPATIENT
Start: 2023-11-16

## 2023-11-16 NOTE — TELEPHONE ENCOUNTER
Patient comment: I know it is too soon but wanted to get the request before thanksgiving to avoud issues getting meds and recently the pharmacy has been out of stock     Medication: lisdexamfetamine (VYVANSE) 50 MG      Date of last refill: 10/25/23 (#30/0R)  [place] 60/68/05     Last office visit: 9/20/23  Due back to clinic per last office note:  6 mon  Date next office visit scheduled:    Future Appointments   Date Time Provider Heavenly Langley   3/20/2024  1:20 PM MD COLBY Morley [facility]           Last OV note recommendation:    Problem/s Identified this visit:   Migraine without aura and without status migrainosus, not intractable  (primary encounter diagnosis)  Attention deficit disorder (ADD) without hyperactivity  Shoulder tendinitis, left        Discussion plus Diagnostics & Treatment Orders: We are going to try a longer acting triptan and see how it will work with her headaches. In the meantime start zonisamide as a preventative. Monitor effects.   She will report back to me in 2 to 3 months whether it is improving or she is responding at which time if nothing works we can transition to CGRP blockers

## 2023-11-16 NOTE — TELEPHONE ENCOUNTER
Medication: Eletriptan Hydrobromide 40 MG      Date of last refill: 9/20/23 (#8/3R)  [place]     Last office visit: 9/20/23  Due back to clinic per last office note:  6 mon  Date next office visit scheduled:    Future Appointments   Date Time Provider Heavenly Langley   3/20/2024  1:20 PM MD COLBY Kee [facility]           Last OV note recommendation:    Problem/s Identified this visit:   Migraine without aura and without status migrainosus, not intractable  (primary encounter diagnosis)  Attention deficit disorder (ADD) without hyperactivity  Shoulder tendinitis, left        Discussion plus Diagnostics & Treatment Orders: We are going to try a longer acting triptan and see how it will work with her headaches. In the meantime start zonisamide as a preventative. Monitor effects.   She will report back to me in 2 to 3 months whether it is improving or she is responding at which time if nothing works we can transition to CGRP blockers

## 2023-12-18 ENCOUNTER — OFFICE VISIT (OUTPATIENT)
Dept: ORTHOPEDICS CLINIC | Facility: CLINIC | Age: 43
End: 2023-12-18
Payer: COMMERCIAL

## 2023-12-18 VITALS — BODY MASS INDEX: 44.41 KG/M2 | HEIGHT: 68 IN | WEIGHT: 293 LBS

## 2023-12-18 DIAGNOSIS — M17.31 POST-TRAUMATIC OSTEOARTHRITIS OF RIGHT KNEE: Primary | ICD-10-CM

## 2023-12-18 RX ORDER — TRIAMCINOLONE ACETONIDE 40 MG/ML
40 INJECTION, SUSPENSION INTRA-ARTICULAR; INTRAMUSCULAR ONCE
Status: SHIPPED | OUTPATIENT
Start: 2023-12-18

## 2024-01-08 RX ORDER — CYANOCOBALAMIN 1000 UG/ML
1000 INJECTION, SOLUTION INTRAMUSCULAR; SUBCUTANEOUS
Qty: 10 ML | Refills: 1 | Status: SHIPPED | OUTPATIENT
Start: 2024-01-08

## 2024-01-08 NOTE — TELEPHONE ENCOUNTER
Medication: cyanocobalamin 1000 MCG/ML      Date of last refill: 11/25/22 (10mL/1R)  [place]      8/31/23 11:40 AM    Vitamin B12  180 - 914 pg/mL 901        Last office visit: 9/20/23  Due back to clinic per last office note:  6 mon  Date next office visit scheduled:    Future Appointments   Date Time Provider Department Center   3/20/2024  1:20 PM Tito Gaines MD [facility] [place]           Last OV note recommendation:    Problem/s Identified this visit:   Migraine without aura and without status migrainosus, not intractable  (primary encounter diagnosis)  Attention deficit disorder (ADD) without hyperactivity  Shoulder tendinitis, left        Discussion plus Diagnostics & Treatment Orders:  We are going to try a longer acting triptan and see how it will work with her headaches.     In the meantime start zonisamide as a preventative.  Monitor effects.  She will report back to me in 2 to 3 months whether it is improving or she is responding at which time if nothing works we can transition to CGRP blockers

## 2024-03-20 ENCOUNTER — TELEPHONE (OUTPATIENT)
Dept: NEUROLOGY | Facility: CLINIC | Age: 44
End: 2024-03-20

## 2024-03-20 ENCOUNTER — OFFICE VISIT (OUTPATIENT)
Dept: NEUROLOGY | Facility: CLINIC | Age: 44
End: 2024-03-20
Payer: COMMERCIAL

## 2024-03-20 VITALS
WEIGHT: 293 LBS | RESPIRATION RATE: 16 BRPM | HEIGHT: 68 IN | SYSTOLIC BLOOD PRESSURE: 128 MMHG | HEART RATE: 82 BPM | BODY MASS INDEX: 44.41 KG/M2 | DIASTOLIC BLOOD PRESSURE: 72 MMHG

## 2024-03-20 DIAGNOSIS — G43.009 MIGRAINE WITHOUT AURA AND WITHOUT STATUS MIGRAINOSUS, NOT INTRACTABLE: Primary | ICD-10-CM

## 2024-03-20 DIAGNOSIS — F98.8 ATTENTION DEFICIT DISORDER (ADD) WITHOUT HYPERACTIVITY: ICD-10-CM

## 2024-03-20 DIAGNOSIS — G43.709 CHRONIC MIGRAINE W/O AURA W/O STATUS MIGRAINOSUS, NOT INTRACTABLE: ICD-10-CM

## 2024-03-20 PROCEDURE — 3074F SYST BP LT 130 MM HG: CPT | Performed by: OTHER

## 2024-03-20 PROCEDURE — 99214 OFFICE O/P EST MOD 30 MIN: CPT | Performed by: OTHER

## 2024-03-20 PROCEDURE — 3078F DIAST BP <80 MM HG: CPT | Performed by: OTHER

## 2024-03-20 PROCEDURE — 3008F BODY MASS INDEX DOCD: CPT | Performed by: OTHER

## 2024-03-20 RX ORDER — FREMANEZUMAB-VFRM 225 MG/1.5ML
225 INJECTION SUBCUTANEOUS
Qty: 4.5 EACH | Refills: 5 | Status: SHIPPED | OUTPATIENT
Start: 2024-03-20

## 2024-03-20 RX ORDER — TIRZEPATIDE 10 MG/.5ML
1 INJECTION, SOLUTION SUBCUTANEOUS WEEKLY
COMMUNITY

## 2024-03-20 RX ORDER — LISDEXAMFETAMINE DIMESYLATE CAPSULES 50 MG/1
50 CAPSULE ORAL EVERY MORNING
Qty: 30 CAPSULE | Refills: 0 | OUTPATIENT
Start: 2024-03-20

## 2024-03-20 NOTE — PROGRESS NOTES
NEUROLOGY  [facility]       Cele Castillo  4/28/1980  Primary Care Provider:  Vivienne Cruz MD    3/20/2024  43 year old yo,  was last seen on:: SE[facility]    Seen for/plans last visit:  Migraines    Previous visit and existing record notes reviewed in preparation for the face to face visit.  Relevant labs and studies reviewed and will be noted in relevant areas of this record.  Accompanied visit:     (x) No.      Present condition:  Her last visit with put her on zonisamide and put her on a longer acting triptan with the idea if things are not improving we can then transition her over to CGRP blockers as preventative.    Could not tolerate zonisamide and so without any preventative.  Takes eletriptan.  Still has recurring headache averaging 2 a week.    Past History update/new problem(s): as above    Review of Systems:  Review of Systems:  Denies systemic symptoms.     No CP or SOB.  No GI or  symptoms. Relevant Neuro as noted above.      Medications:      Current Outpatient Medications:     ZEPBOUND 10 MG/0.5ML Subcutaneous Solution Auto-injector, Inject 1 each into the skin once a week., Disp: , Rfl:     cyanocobalamin 1000 MCG/ML Injection Solution, Inject 1 mL (1,000 mcg total) into the muscle every 14 (fourteen) days., Disp: 10 mL, Rfl: 1    hydrocortisone 2.5 % External Cream, , Disp: , Rfl:     Eletriptan Hydrobromide 40 MG Oral Tab, use at onset; may repeat once after 4 hours- ONLY 2 IN 24 HOUR PERIOD MAX.  This is a 30 day supply., Disp: 24 tablet, Rfl: 1    lisdexamfetamine (VYVANSE) 50 MG Oral Cap, Take 1 capsule (50 mg total) by mouth every morning., Disp: 30 capsule, Rfl: 0    SUMAtriptan 5 MG/ACT Nasal Solution, 1 actuation in each nostril x 1, may repeat in 2 hours if headache recurs, Disp: 18 each, Rfl: 3    BD INSULIN SYRINGE U/F 30G X 1/2\" 1 ML Does not apply Misc, , Disp: , Rfl:     levothyroxine 125 MCG Oral Tab, Take 1 tablet (125 mcg total) by mouth before breakfast.,  Disp: , Rfl:     FLUTICASONE PROPIONATE NA, by Nasal route., Disp: , Rfl:     ergocalciferol 12704 UNITS Oral Cap, Take 1 capsule (50,000 Units total) by mouth once a week., Disp: , Rfl: 1    Omeprazole 40 MG Oral Capsule Delayed Release, Take 1 capsule (40 mg total) by mouth daily., Disp: , Rfl:     Spironolactone 50 MG Oral Tab, Take 1 tablet (50 mg total) by mouth daily., Disp: , Rfl:   PRN:     Allergies:  Allergies   Allergen Reactions    Neomycin-Bacitracin-Polymyxin OTHER (SEE COMMENTS)      External   External    Triamcinolone OTHER (SEE COMMENTS)     From dermatologist  From dermatologist    Bacitracin OTHER (SEE COMMENTS)      External    Zonisamide OTHER (SEE COMMENTS)     Nerve shooting pain          EXAM:  /72 (BP Location: Left arm, Patient Position: Sitting, Cuff Size: large)   Pulse 82   Resp 16   Ht 68\"   Wt (!) 307 lb (139.3 kg)   BMI 46.68 kg/m²   Looks stated age  General Exam:  HENT:  pink conjunctiva anicteric sclerae  Neck no adenopathy, thyroid normal  Heart and Lungs:  normal  Extremities: no cyanosis, skin changes    NEURO  Nonfocal exam  Tenderness in the posterior neck region.      INTERPRETATION of RELEVANT LABS and other DATA:          Problem/s Identified this visit:   1. Migraine without aura and without status migrainosus, not intractable          Discussion plus Diagnostics & Treatment Orders:  Put on Nurtec every other day as preventative can convert to abortive treatment and readjust schedules.  Can also use triptan as abortive treatment while taking Nurtec.    NOTE: AJOVY instead as Nurtec not covered    Orders Placed This Encounter    ZEPBOUND 10 MG/0.5ML Subcutaneous Solution Auto-injector     Sig: Inject 1 each into the skin once a week.    Fremanezumab-vfrm (AJOVY) 225 MG/1.5ML Subcutaneous Solution Auto-injector     Sig: Inject 225 mg into the skin every 30 (thirty) days.     Dispense:  4.5 each     Refill:  5         (x) Discussed potential side effects of any  treatment relevant to above.  Includes explanation of tests as necessary.    Return in about 6 months (around 9/20/2024).      Patient understands that if needed, based on condition and or test results, follow up will be readjusted      Tito Gaines MD  Vascular & General Neurology  Director, Multiple Sclerosis Program  [facility]  3/20/2024, Time completed 1:33 PM    Decision making:  ( x ) labs reviewed/ordered - 1  (  ) new diagnosis: - 1  ( x) Images & studies independently reviewed -non F2F  (  ) Case/studies discussed with other caregivers - -non F2F  (  ) Telephone time with patiern or authorized Fam member--non F2F  ( x ) other records reviewed --non F2F including consultations  (  ) [facility] meetings - patient not present --non F2F  (  ) Independent Historian obtained    Non Face to Face CPT code 74040/75151 applies as documented above    PROCEDURE DONE     (   ) see notes        After visit, patient was escorted out and handed-off discharge process and instructions to the check out desk.  No additional issues relevant to visit were raised to staff at this time interval.        This document is to be interpreted as my current opinion regarding the case as of the stated date of service based on the information available to me at this time and may supersedes any prior opinion expressed either orally or in writing.  Services rendered are only within the scope of direct medical care  Sometimes, reports may have been prepared partially using a speech recognition software technology.  If a word or phrase is confusing or out of context, please do not hesitate to call for clarification.

## 2024-03-21 RX ORDER — LISDEXAMFETAMINE DIMESYLATE CAPSULES 50 MG/1
50 CAPSULE ORAL EVERY MORNING
Qty: 30 CAPSULE | Refills: 0 | Status: SHIPPED | OUTPATIENT
Start: 2024-03-21

## 2024-03-21 NOTE — TELEPHONE ENCOUNTER
Received fax from [employer]   Approval received for patient use of Ajovy   Approval granted: 2/20/24-3/21/25        Pt notified

## 2024-03-22 RX ORDER — OMEPRAZOLE 40 MG/1
CAPSULE, DELAYED RELEASE ORAL
Qty: 180 CAPSULE | Refills: 3 | OUTPATIENT
Start: 2024-03-22

## 2024-04-17 DIAGNOSIS — G43.009 MIGRAINE WITHOUT AURA AND WITHOUT STATUS MIGRAINOSUS, NOT INTRACTABLE: Primary | ICD-10-CM

## 2024-04-17 DIAGNOSIS — F98.8 ATTENTION DEFICIT DISORDER (ADD) WITHOUT HYPERACTIVITY: ICD-10-CM

## 2024-04-17 RX ORDER — LISDEXAMFETAMINE DIMESYLATE CAPSULES 50 MG/1
50 CAPSULE ORAL EVERY MORNING
Qty: 30 CAPSULE | Refills: 0 | Status: SHIPPED | OUTPATIENT
Start: 2024-04-17

## 2024-04-17 RX ORDER — ELETRIPTAN HYDROBROMIDE 40 MG/1
TABLET, FILM COATED ORAL
Qty: 24 TABLET | Refills: 1 | Status: SHIPPED | OUTPATIENT
Start: 2024-04-17 | End: 2024-04-17

## 2024-04-17 RX ORDER — ELETRIPTAN HYDROBROMIDE 40 MG/1
TABLET, FILM COATED ORAL
Qty: 18 TABLET | Refills: 5 | Status: SHIPPED | OUTPATIENT
Start: 2024-04-17

## 2024-04-17 NOTE — TELEPHONE ENCOUNTER
Medication: Eletriptan 40mg     Date of last refill: 11/16/2023 with 1 addt refill  [place]      Last office visit: 03/20/2024  Due back to clinic per last office note:    Date next office visit scheduled:    Future Appointments   Date Time Provider Department Center   9/18/2024  1:20 PM Tito Gaines MD [facility] [place]           Last OV note recommendation:    Discussion plus Diagnostics & Treatment Orders:  Put on Nurtec every other day as preventative can convert to abortive treatment and readjust schedules.  Can also use triptan as abortive treatment while taking Nurtec.     NOTE: AJOVY instead as Nurtec not covered          Orders Placed This Encounter    ZEPBOUND 10 MG/0.5ML Subcutaneous Solution Auto-injector       Sig: Inject 1 each into the skin once a week.    Fremanezumab-vfrm (AJOVY) 225 MG/1.5ML Subcutaneous Solution Auto-injector       Sig: Inject 225 mg into the skin every 30 (thirty) days.       Dispense:  4.5 each       Refill:  5            (x) Discussed potential side effects of any treatment relevant to above.  Includes explanation of tests as necessary.     Return in about 6 months (around 9/20/2024).

## 2024-04-17 NOTE — TELEPHONE ENCOUNTER
Patient calling, requesting to know if there is a possibility of getting a 90 day supply of eletriptan. PT states she knows dosing limitations, but prefers 90 day fill of medication if possible.     Please advise and call back.

## 2024-04-17 NOTE — TELEPHONE ENCOUNTER
Called and spoke with patient who requested the prescription be sent to [facility] in [place] with a quantity of 18. This was the previous quantity prescribed. Does not want prescription sent to [facility].    New prescription sent.

## 2024-04-17 NOTE — TELEPHONE ENCOUNTER
Medication: Vyvanse 50 mg     Date of last refill: 03/21/2024  [place]      Last office visit: 03/20/2024  Due back to clinic per last office note:    Date next office visit scheduled:    Future Appointments   Date Time Provider Department Center   9/18/2024  1:20 PM Tito Gaines MD [facility] [place]           Last OV note recommendation:    Discussion plus Diagnostics & Treatment Orders:  Put on Nurtec every other day as preventative can convert to abortive treatment and readjust schedules.  Can also use triptan as abortive treatment while taking Nurtec.     NOTE: AJOVY instead as Nurtec not covered          Orders Placed This Encounter    ZEPBOUND 10 MG/0.5ML Subcutaneous Solution Auto-injector       Sig: Inject 1 each into the skin once a week.    Fremanezumab-vfrm (AJOVY) 225 MG/1.5ML Subcutaneous Solution Auto-injector       Sig: Inject 225 mg into the skin every 30 (thirty) days.       Dispense:  4.5 each       Refill:  5            (x) Discussed potential side effects of any treatment relevant to above.  Includes explanation of tests as necessary.     Return in about 6 months (around 9/20/2024).

## 2024-05-17 ENCOUNTER — PATIENT MESSAGE (OUTPATIENT)
Dept: NEUROLOGY | Facility: CLINIC | Age: 44
End: 2024-05-17

## 2024-05-17 DIAGNOSIS — F98.8 ATTENTION DEFICIT DISORDER (ADD) WITHOUT HYPERACTIVITY: ICD-10-CM

## 2024-05-17 DIAGNOSIS — G43.009 MIGRAINE WITHOUT AURA AND WITHOUT STATUS MIGRAINOSUS, NOT INTRACTABLE: ICD-10-CM

## 2024-05-20 RX ORDER — LISDEXAMFETAMINE DIMESYLATE 50 MG/1
50 CAPSULE ORAL EVERY MORNING
Qty: 30 CAPSULE | Refills: 0 | Status: SHIPPED | OUTPATIENT
Start: 2024-05-20

## 2024-05-20 NOTE — TELEPHONE ENCOUNTER
Medication: Vyvanse 50 mg     Date of last refill:04/17/2024  [place]      Last office visit: 03/20/2024  Due back to clinic per last office note:    Date next office visit scheduled:           Future Appointments   Date Time Provider Department Center   9/18/2024  1:20 PM Tito Gainse MD [facility] [place]            Last OV note recommendation:     Discussion plus Diagnostics & Treatment Orders:  Put on Nurtec every other day as preventative can convert to abortive treatment and readjust schedules.  Can also use triptan as abortive treatment while taking Nurtec.     NOTE: AJOVY instead as Nurtec not covered             Orders Placed This Encounter    ZEPBOUND 10 MG/0.5ML Subcutaneous Solution Auto-injector       Sig: Inject 1 each into the skin once a week.    Fremanezumab-vfrm (AJOVY) 225 MG/1.5ML Subcutaneous Solution Auto-injector       Sig: Inject 225 mg into the skin every 30 (thirty) days.       Dispense:  4.5 each       Refill:  5            (x) Discussed potential side effects of any treatment relevant to above.  Includes explanation of tests as necessary.     Return in about 6 months (around 9/20/2024).

## 2024-05-20 NOTE — TELEPHONE ENCOUNTER
Called [facility], per patient's request.  A case was created utilizing automated prompts for Eletriptan 40mg tab qty 18 for 45 days.      Case ID# [id number]    Clinical questions to be faxed to the office.  Awaiting fax.

## 2024-05-20 NOTE — TELEPHONE ENCOUNTER
From: Cele Castillo  To: Tito Gaines  Sent: 5/17/2024 11:53 PM CDT  Subject: Eletriptan    Hi I sent a message on 4/18/24 but haven’t ever heard back.     My prescription coverage has a lot of rules about this med and they recommend for my dr office to call [phone] to request patient level authorization. They said it is similar to a prior auth.      Could your office request 30tabs of Eletriptan Hydrobromide 40 MG Tabs to be a 90 day supply, maybe they would then cover at least 24 since they are boxes of 6 I believe.      If 24-30 is approved could then that request for 90 day supply be sent to [facility].

## 2024-05-24 RX ORDER — ELETRIPTAN HYDROBROMIDE 40 MG/1
TABLET, FILM COATED ORAL
Qty: 18 TABLET | Refills: 5 | Status: SHIPPED | OUTPATIENT
Start: 2024-05-24

## 2024-05-24 NOTE — TELEPHONE ENCOUNTER
Received fax from [employer]   Approval received for patient use of Eletriptan   Approval granted: 4/21/24-5/21/25        Pt notified

## 2024-06-18 DIAGNOSIS — F98.8 ATTENTION DEFICIT DISORDER (ADD) WITHOUT HYPERACTIVITY: ICD-10-CM

## 2024-06-18 NOTE — TELEPHONE ENCOUNTER
Medication: lisdexamfetamine (VYVANSE) 50 MG      Date of last refill: 5/20/24 (#30/0)  [place]24     Last office visit: 3/20/24  Due back to clinic per last office note:  6 mon  Date next office visit scheduled:    Future Appointments   Date Time Provider Department Center   8/21/2024  1:00 PM Tito Gaines MD [facility] [place]           Last OV note recommendation:    Problem/s Identified this visit:   1. Migraine without aura and without status migrainosus, not intractable             Discussion plus Diagnostics & Treatment Orders:  Put on Nurtec every other day as preventative can convert to abortive treatment and readjust schedules.  Can also use triptan as abortive treatment while taking Nurtec.     NOTE: AJOVY instead as Nurtec not covered          Orders Placed This Encounter    ZEPBOUND 10 MG/0.5ML Subcutaneous Solution Auto-injector       Sig: Inject 1 each into the skin once a week.    Fremanezumab-vfrm (AJOVY) 225 MG/1.5ML Subcutaneous Solution Auto-injector       Sig: Inject 225 mg into the skin every 30 (thirty) days.       Dispense:  4.5 each       Refill:  5

## 2024-06-19 RX ORDER — LISDEXAMFETAMINE DIMESYLATE 50 MG/1
50 CAPSULE ORAL EVERY MORNING
Qty: 30 CAPSULE | Refills: 0 | Status: SHIPPED | OUTPATIENT
Start: 2024-06-19

## 2024-07-17 DIAGNOSIS — F98.8 ATTENTION DEFICIT DISORDER (ADD) WITHOUT HYPERACTIVITY: ICD-10-CM

## 2024-07-18 RX ORDER — LISDEXAMFETAMINE DIMESYLATE 50 MG/1
50 CAPSULE ORAL EVERY MORNING
Qty: 30 CAPSULE | Refills: 0 | Status: SHIPPED | OUTPATIENT
Start: 2024-07-18

## 2024-07-18 NOTE — TELEPHONE ENCOUNTER
Medication: Vyvanse 50 mg     Date of last refill:06/19/2024  [place]      Last office visit: 03/20/2024  Due back to clinic per last office note:    Date next office visit scheduled:                Future Appointments   Date Time Provider Department Center   9/18/2024  1:20 PM Tito Gaines MD [facility]            Last OV note recommendation:     Discussion plus Diagnostics & Treatment Orders:  Put on Nurtec every other day as preventative can convert to abortive treatment and readjust schedules.  Can also use triptan as abortive treatment while taking Nurtec.     NOTE: AJOVY instead as Nurtec not covered             Orders Placed This Encounter    ZEPBOUND 10 MG/0.5ML Subcutaneous Solution Auto-injector       Sig: Inject 1 each into the skin once a week.    Fremanezumab-vfrm (AJOVY) 225 MG/1.5ML Subcutaneous Solution Auto-injector       Sig: Inject 225 mg into the skin every 30 (thirty) days.       Dispense:  4.5 each       Refill:  5            (x) Discussed potential side effects of any treatment relevant to above.  Includes explanation of tests as necessary.     Return in about 6 months (around 9/20/2024).

## 2024-09-19 ENCOUNTER — OFFICE VISIT (OUTPATIENT)
Dept: FAMILY MEDICINE CLINIC | Facility: CLINIC | Age: 44
End: 2024-09-19
Payer: COMMERCIAL

## 2024-09-19 VITALS
OXYGEN SATURATION: 97 % | TEMPERATURE: 98 F | BODY MASS INDEX: 43.95 KG/M2 | WEIGHT: 290 LBS | DIASTOLIC BLOOD PRESSURE: 86 MMHG | HEIGHT: 68 IN | HEART RATE: 85 BPM | RESPIRATION RATE: 18 BRPM | SYSTOLIC BLOOD PRESSURE: 132 MMHG
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
## 2024-09-19 DIAGNOSIS — L08.9 SKIN INFECTION: ICD-10-CM

## 2024-09-19 DIAGNOSIS — F98.8 ATTENTION DEFICIT DISORDER (ADD) WITHOUT HYPERACTIVITY: ICD-10-CM

## 2024-09-19 DIAGNOSIS — S80.862A INSECT BITE OF LEFT LOWER LEG, INITIAL ENCOUNTER: Primary | ICD-10-CM

## 2024-09-19 PROCEDURE — 3079F DIAST BP 80-89 MM HG: CPT | Performed by: NURSE PRACTITIONER

## 2024-09-19 PROCEDURE — 3008F BODY MASS INDEX DOCD: CPT | Performed by: NURSE PRACTITIONER

## 2024-09-19 PROCEDURE — 99213 OFFICE O/P EST LOW 20 MIN: CPT | Performed by: NURSE PRACTITIONER

## 2024-09-19 PROCEDURE — 3075F SYST BP GE 130 - 139MM HG: CPT | Performed by: NURSE PRACTITIONER

## 2024-09-19 RX ORDER — CEPHALEXIN 500 MG/1
500 CAPSULE ORAL 3 TIMES DAILY
Qty: 30 CAPSULE | Refills: 0 | Status: SHIPPED | OUTPATIENT
Start: 2024-09-19 | End: 2024-09-29

## 2024-09-19 NOTE — PROGRESS NOTES
CHIEF COMPLAINT:     Chief Complaint   Patient presents with    Insect Bite     Not sure if it's a bite, but maybe the start of cellulitis - Entered by patient       HPI:     Cele Castillo is a 44 year old female who presents with concerns of a possible skin infection from an insect bite to lower leg. Pt doesn't remember getting stung or bit but  noted small red bump to left lower leg in the past 24hrs. Notes this afternoon that the area has become more reddened and warm to touch. Denies fever, streaking of wound, or other signs of systemic illness. Tolerates PO well at home. No n/v/d. Denies any other aggravating or relieving factors at home. Denies any other treatment attempts prior to arrival.        Current Outpatient Medications   Medication Sig Dispense Refill    cephALEXin (KEFLEX) 500 MG Oral Cap Take 1 capsule (500 mg total) by mouth 3 (three) times daily for 10 days. 30 capsule 0    Eletriptan Hydrobromide 40 MG Oral Tab use at onset; may repeat once after 4 hours- ONLY 2 IN 24 HOUR PERIOD MAX.  This is a 30 day supply. 18 tablet 11    Amphetamine-Dextroamphet ER (ADDERALL XR) 20 MG Oral Capsule SR 24 Hr Take 1 capsule (20 mg total) by mouth daily. 30 capsule 0    [START ON 9/21/2024] Amphetamine-Dextroamphet ER (ADDERALL XR) 20 MG Oral Capsule SR 24 Hr Take 1 capsule (20 mg total) by mouth daily. 30 capsule 0    [START ON 10/22/2024] Amphetamine-Dextroamphet ER (ADDERALL XR) 20 MG Oral Capsule SR 24 Hr Take 1 capsule (20 mg total) by mouth daily. 30 capsule 0    lisdexamfetamine (VYVANSE) 50 MG Oral Cap Take 1 capsule (50 mg total) by mouth every morning. 30 capsule 0    ZEPBOUND 10 MG/0.5ML Subcutaneous Solution Auto-injector Inject 1 each into the skin once a week.      Fremanezumab-vfrm (AJOVY) 225 MG/1.5ML Subcutaneous Solution Auto-injector Inject 225 mg into the skin every 30 (thirty) days. 4.5 each 5    cyanocobalamin 1000 MCG/ML Injection Solution Inject 1 mL (1,000 mcg total) into the muscle  every 14 (fourteen) days. 10 mL 1    hydrocortisone 2.5 % External Cream       SUMAtriptan 5 MG/ACT Nasal Solution 1 actuation in each nostril x 1, may repeat in 2 hours if headache recurs 18 each 3    BD INSULIN SYRINGE U/F 30G X 1/2\" 1 ML Does not apply Misc       levothyroxine 125 MCG Oral Tab Take 1 tablet (125 mcg total) by mouth before breakfast.      FLUTICASONE PROPIONATE NA by Nasal route.      ergocalciferol 14490 UNITS Oral Cap Take 1 capsule (50,000 Units total) by mouth once a week.  1    Omeprazole 40 MG Oral Capsule Delayed Release Take 1 capsule (40 mg total) by mouth daily.      Spironolactone 50 MG Oral Tab Take 1 tablet (50 mg total) by mouth daily.        Past Medical History:    Attention deficit disorder without mention of hyperactivity    GERD (gastroesophageal reflux disease)    Migraines    Mild cognitive impairment, so stated    Mitral valve prolapse    Nontoxic uninodular goiter    Other B-complex deficiencies    Other nonspecific findings on examination of blood(790.99)      Social History:  Social History     Socioeconomic History    Marital status: Single    Number of children: 0   Tobacco Use    Smoking status: Never     Passive exposure: Never    Smokeless tobacco: Never   Vaping Use    Vaping status: Never Used   Substance and Sexual Activity    Alcohol use: Yes     Alcohol/week: 0.0 standard drinks of alcohol     Comment: occasional    Drug use: No   Other Topics Concern    Caffeine Concern Yes     Comment: 1-2  sodas daily    Exercise Yes     Comment: walks a mile      Social Determinants of Health     Food Insecurity: No Food Insecurity (4/4/2024)    Received from [facility]    Food Insecurity     Currently or in the past 3 months, have you worried your food would run out before you had money to buy more?: No     In the past 12 months, have you run out of food or been unable to get more?: No   Transportation Needs: No Transportation Needs (4/4/2024)    Received  from [facility]    Transportation Needs     Medical Transportation Needs?: No   Physical Activity: Sufficiently Active (10/9/2020)    Received from [facility], [facility]    Exercise Vital Sign     Days of Exercise per Week: 7 days     Minutes of Exercise per Session: 60 min    Received from [facility], [facility]    Social Connections        REVIEW OF SYSTEMS:   GENERAL: feels well otherwise, no fever, no chills.  SKIN: as above.  CHEST: no chest pains, no palpitations.  LUNGS: denies shortness of breath with exertion or rest. No wheezing, no cough.  LYMPH: No enlargement of the lymph nodes.  MUSC/SKEL: no joint swelling, no joint stiffness.  NEURO: no abnormal sensation, no tingling of the skin or numbness.    EXAM:   /86   Pulse 85   Temp 97.8 °F (36.6 °C)   Resp 18   Ht 5' 8\" (1.727 m)   Wt 290 lb (131.5 kg)   SpO2 97%   BMI 44.09 kg/m²   GENERAL: well developed, well nourished,in no apparent distress  SKIN: there is a small erythemtous papular lesion noted to left anterior distal tibial region. There is 2-3cm of surrounding erythema. No open lesion or drainage. No fluctuance noted. There is some tactile warmth noted compared to unaffected areas.   HEAD: atraumatic, normocephalic  EYES: conjunctiva clear, EOM intact  NOSE: Normal external nose.  No rhinorrhea.  NECK: supple, non-tender  LUNGS: clear to auscultation bilaterally, no wheezes or rhonchi. Breathing is non labored.  CARDIO: RRR without murmur  EXTREMITIES: no cyanosis, clubbing or edema.  Cap refill brisk- less than 2 seconds.   LYMPH: No lymphadenopathy.      ASSESSMENT AND PLAN:       ICD-10-CM    1. Insect bite of left lower leg, initial encounter  S80.862A     W57.XXXA       2. Skin infection  L08.9 cephALEXin (KEFLEX) 500 MG Oral Cap          Discussed physical exam and hpi with pt.Pt has reassuring physical exam consistent with an insect bite to left  lower leg We discussed histamine reaction vs early/mild secondary infection. I have a higher suspicion for histamine reaction as the bite occurred in the past 24hrs. Treatment options discussed with patient and explained in detail. She would like to cover for both histamine and infectious etiology at this time. Will start topical hydrocortisone along with otc antihistamine/supportive care along with oral cephalexin.The risks, benefits and potential side effects of possible medications were reviewed. Alternatives were discussed. Monitoring parameters and expected course outlined. Patient to take pt to emergency department if symptoms fail to respond as outlined, or worsen in any way. The patient agreed with the plan     Patient Instructions   Rest. Keep area clean.   Supportive care as discussed.   Hydrocortisone and otc antihistamine as directed.  Keflex as prescribed.   Follow up with PMD in 2-3  days for reeval. Follow up sooner or go to the emergency department immediately if symptoms worsen, change, you develop fevers, body aches,redness beyond marked line,  or if you have any concerns.

## 2024-09-19 NOTE — PATIENT INSTRUCTIONS
Rest. Keep area clean.   Supportive care as discussed.   Hydrocortisone and otc antihistamine as directed.  Keflex as prescribed.   Follow up with PMD in 2-3  days for reeval. Follow up sooner or go to the emergency department immediately if symptoms worsen, change, you develop fevers, body aches,redness beyond marked line,  or if you have any concerns.     Statement Selected

## 2024-09-20 RX ORDER — DEXTROAMPHETAMINE SACCHARATE, AMPHETAMINE ASPARTATE MONOHYDRATE, DEXTROAMPHETAMINE SULFATE AND AMPHETAMINE SULFATE 5; 5; 5; 5 MG/1; MG/1; MG/1; MG/1
20 CAPSULE, EXTENDED RELEASE ORAL DAILY
Qty: 30 CAPSULE | Refills: 0 | Status: SHIPPED | OUTPATIENT
Start: 2024-09-20 | End: 2024-10-20

## 2024-09-20 NOTE — TELEPHONE ENCOUNTER
Patient stated adderall is not available in [place] and to send the adderall to:    [facility]

## 2024-09-20 NOTE — TELEPHONE ENCOUNTER
Patient calling office again 09/20/24 to get an update on the refill request. Patient stated [facility] does [facility] the prescription but the request would need to be processed by 1pm today. Please contact the patient once the refill request has been submitted for patient confirmation.

## 2024-09-20 NOTE — TELEPHONE ENCOUNTER
Patient comment:[facility] in [place] is backorder can you please send to [facility] in [place] as they currently have enough for 1 month. Maybe the rx that was alreasy on file with [place] [facility] needs to be cancelled on your side?     Medication: Adderall 20 mg     Date of last refill: 08/21/2024  [place]      Last office visit: 08/21/2024  Due back to clinic per last office note:    Date next office visit scheduled:    No future appointments.        Last OV note recommendation:    Discussion plus Diagnostics & Treatment Orders:       Orders Placed This Encounter    Eletriptan Hydrobromide 40 MG Oral Tab       Sig: use at onset; may repeat once after 4 hours- ONLY 2 IN 24 HOUR PERIOD MAX.  This is a 30 day supply.       Dispense:  18 tablet       Refill:  11    Amphetamine-Dextroamphet ER (ADDERALL XR) 20 MG Oral Capsule SR 24 Hr       Sig: Take 1 capsule (20 mg total) by mouth daily.       Dispense:  30 capsule       Refill:  0    Amphetamine-Dextroamphet ER (ADDERALL XR) 20 MG Oral Capsule SR 24 Hr       Sig: Take 1 capsule (20 mg total) by mouth daily.       Dispense:  30 capsule       Refill:  0    Amphetamine-Dextroamphet ER (ADDERALL XR) 20 MG Oral Capsule SR 24 Hr       Sig: Take 1 capsule (20 mg total) by mouth daily.       Dispense:  30 capsule       Refill:  0      No AJOVY for now unless HA becomes more frequent        (x) Discussed potential side effects of any treatment relevant to above.  Includes explanation of tests as necessary.     Return in about 6 months (around 2/21/2025).

## 2024-11-21 DIAGNOSIS — F98.8 ATTENTION DEFICIT DISORDER (ADD) WITHOUT HYPERACTIVITY: ICD-10-CM

## 2024-11-21 NOTE — TELEPHONE ENCOUNTER
Medication: Adderall 20 mg     Date of last refill: 10/22/2024  [place]     Last office visit: 08/21/2024  Due back to clinic per last office note:    Date next office visit scheduled:    No future appointments.        Last OV note recommendation:     Discussion plus Diagnostics & Treatment Orders:          Orders Placed This Encounter    Eletriptan Hydrobromide 40 MG Oral Tab       Sig: use at onset; may repeat once after 4 hours- ONLY 2 IN 24 HOUR PERIOD MAX.  This is a 30 day supply.       Dispense:  18 tablet       Refill:  11    Amphetamine-Dextroamphet ER (ADDERALL XR) 20 MG Oral Capsule SR 24 Hr       Sig: Take 1 capsule (20 mg total) by mouth daily.       Dispense:  30 capsule       Refill:  0    Amphetamine-Dextroamphet ER (ADDERALL XR) 20 MG Oral Capsule SR 24 Hr       Sig: Take 1 capsule (20 mg total) by mouth daily.       Dispense:  30 capsule       Refill:  0    Amphetamine-Dextroamphet ER (ADDERALL XR) 20 MG Oral Capsule SR 24 Hr       Sig: Take 1 capsule (20 mg total) by mouth daily.       Dispense:  30 capsule       Refill:  0      No AJOVY for now unless HA becomes more frequent        (x) Discussed potential side effects of any treatment relevant to above.  Includes explanation of tests as necessary.     Return in about 6 months (around 2/21/2025).

## 2024-11-22 RX ORDER — DEXTROAMPHETAMINE SACCHARATE, AMPHETAMINE ASPARTATE MONOHYDRATE, DEXTROAMPHETAMINE SULFATE AND AMPHETAMINE SULFATE 5; 5; 5; 5 MG/1; MG/1; MG/1; MG/1
20 CAPSULE, EXTENDED RELEASE ORAL DAILY
Qty: 30 CAPSULE | Refills: 0 | Status: SHIPPED | OUTPATIENT
Start: 2024-11-22 | End: 2024-12-22

## 2024-11-22 NOTE — TELEPHONE ENCOUNTER
Patient called to get update on adderall refill.    Advised in process.    Please call patient to discuss when would be able to filled since will not have enough this weekend.

## 2024-12-12 DIAGNOSIS — F98.8 ATTENTION DEFICIT DISORDER (ADD) WITHOUT HYPERACTIVITY: ICD-10-CM

## 2024-12-13 NOTE — TELEPHONE ENCOUNTER
Medication: Amphetamine-Dextroamphet ER (ADDERALL XR) 20 MG      Date of last refill: 12/22/24 (#30/0R)  [place]24     Last office visit: 8/21/24  Due back to clinic per last office note:  6 mon  Date next office visit scheduled:    Future Appointments   Date Time Provider Department Center   1/15/2025 11:00 AM Tito Gaines MD [facility]           Last OV note recommendation:    Problem/s Identified this visit:   1. Attention deficit disorder (ADD) without hyperactivity    2. Migraine without aura and without status migrainosus, not intractable    3. Chronic migraine w/o aura w/o status migrainosus, not intractable             Discussion plus Diagnostics & Treatment Orders:       Orders Placed This Encounter    Eletriptan Hydrobromide 40 MG Oral Tab       Sig: use at onset; may repeat once after 4 hours- ONLY 2 IN 24 HOUR PERIOD MAX.  This is a 30 day supply.       Dispense:  18 tablet       Refill:  11    Amphetamine-Dextroamphet ER (ADDERALL XR) 20 MG Oral Capsule SR 24 Hr       Sig: Take 1 capsule (20 mg total) by mouth daily.       Dispense:  30 capsule       Refill:  0    Amphetamine-Dextroamphet ER (ADDERALL XR) 20 MG Oral Capsule SR 24 Hr       Sig: Take 1 capsule (20 mg total) by mouth daily.       Dispense:  30 capsule       Refill:  0    Amphetamine-Dextroamphet ER (ADDERALL XR) 20 MG Oral Capsule SR 24 Hr       Sig: Take 1 capsule (20 mg total) by mouth daily.       Dispense:  30 capsule       Refill:  0      No AJOVY for now unless HA becomes more frequent

## 2024-12-16 RX ORDER — DEXTROAMPHETAMINE SACCHARATE, AMPHETAMINE ASPARTATE MONOHYDRATE, DEXTROAMPHETAMINE SULFATE AND AMPHETAMINE SULFATE 5; 5; 5; 5 MG/1; MG/1; MG/1; MG/1
20 CAPSULE, EXTENDED RELEASE ORAL DAILY
Qty: 30 CAPSULE | Refills: 0 | Status: SHIPPED | OUTPATIENT
Start: 2024-12-21 | End: 2025-01-20

## 2024-12-23 DIAGNOSIS — F98.8 ATTENTION DEFICIT DISORDER (ADD) WITHOUT HYPERACTIVITY: ICD-10-CM

## 2024-12-23 RX ORDER — DEXTROAMPHETAMINE SACCHARATE, AMPHETAMINE ASPARTATE MONOHYDRATE, DEXTROAMPHETAMINE SULFATE AND AMPHETAMINE SULFATE 5; 5; 5; 5 MG/1; MG/1; MG/1; MG/1
20 CAPSULE, EXTENDED RELEASE ORAL DAILY
Qty: 30 CAPSULE | Refills: 0 | Status: SHIPPED | OUTPATIENT
Start: 2024-12-23 | End: 2025-01-22

## 2024-12-23 NOTE — TELEPHONE ENCOUNTER
Amphetamine-Dextroamphet ER (ADDERALL XR) 20 MG Oral Capsule SR 24 Hr [Brenda Gipson]       Patient Comment: Can you please pull Rx from [facility] & send to [facility]. I am out of medication and [facility] tried to order but it is on back order. [facility] in [place] said they have enough today to fill but that could change. - Amphetamine-Dextroamphet ER (ADDERALL XR) 20 MG Oral Capsule SR 24 Hr

## 2024-12-23 NOTE — TELEPHONE ENCOUNTER
Patient called is out of medication Adderall. Patient states the pharmacythe medication was sent too is out of stock and is requesting a new script to be sent to [facility] on  [id number] in [place].  Pharm phone [phone]. Due to being a controlled substance patient states pharmacy won't transfer. Please return patient call with any questions at [phone]. Thank you.

## 2025-01-15 ENCOUNTER — PATIENT MESSAGE (OUTPATIENT)
Dept: NEUROLOGY | Facility: CLINIC | Age: 45
End: 2025-01-15

## 2025-01-15 DIAGNOSIS — F98.8 ATTENTION DEFICIT DISORDER (ADD) WITHOUT HYPERACTIVITY: ICD-10-CM

## 2025-01-15 NOTE — TELEPHONE ENCOUNTER
Called and spoke with patient..  Pt is requesting printed Rx of Adderall XR 20mg capsules. States at OV today, 1/15/25, she returned the printed scripts as \"not printed on correct paper\", is requesting reprint to  at [facility].  Pt notified again that if pt's mother will  Rx, she will need to bring drivers license    Pt also requesting to contact pharmacy regarding Adderall 20mg tablets, states the \"pharmacy is having trouble filling prescription\".   Called and spoke w/ pharmacy, able to fill Rx  Pt notified to contact pharmacy for .    Rx pending for print

## 2025-01-15 NOTE — TELEPHONE ENCOUNTER
Shared with patient that Adderall is a controlled prescription and her request   \"I am requesting if 2-3 months of scripts of Rx #2 (20mg generic adderall XR due on 1/22/25 -30 days from 12/23/24) and then also in February (2/21/25) and if possible a 3rd script for March (3/23/25) be printed in [employer].\" cannot be fulfilled.      Shared that patient call the office monthly for a refill; so the office would delete the electronic request received via epic. The prescription can be printed and your mom can pick it up as long as she provides a drivers licence and we have your permission; or the prescription can be electronically sent to your preferred pharmacy.     Waiting for patient's response.   Hide Additional Notes?: No Detail Level: Simple Detail Level: Generalized Detail Level: Zone

## 2025-01-16 RX ORDER — DEXTROAMPHETAMINE SACCHARATE, AMPHETAMINE ASPARTATE MONOHYDRATE, DEXTROAMPHETAMINE SULFATE AND AMPHETAMINE SULFATE 5; 5; 5; 5 MG/1; MG/1; MG/1; MG/1
20 CAPSULE, EXTENDED RELEASE ORAL DAILY
Qty: 30 CAPSULE | Refills: 0 | Status: SHIPPED | OUTPATIENT
Start: 2025-01-23 | End: 2025-02-22

## 2025-01-20 NOTE — TELEPHONE ENCOUNTER
Pt presented to office on 1/20/24 to return paper Rx  States that when brought to pharmacy, pharmacist stated they were \"unable to fill as not printed on correct paper\"   Pt notified that we will send in Rx to requested pharmacy when pt contact office when due for Rx  Notified pt will need to provided preferred pharmacy at time of refill request.  Pt also notified that all medication refills have a 72 hour turn around and not to wait until patient has nothing left when due for refill as per turn around time.    Pt expressed understanding, paper Rx placed in shredder.

## 2025-02-13 DIAGNOSIS — G43.009 MIGRAINE WITHOUT AURA AND WITHOUT STATUS MIGRAINOSUS, NOT INTRACTABLE: ICD-10-CM

## 2025-02-13 DIAGNOSIS — F98.8 ATTENTION DEFICIT DISORDER (ADD) WITHOUT HYPERACTIVITY: ICD-10-CM

## 2025-02-13 RX ORDER — DEXTROAMPHETAMINE SACCHARATE, AMPHETAMINE ASPARTATE, DEXTROAMPHETAMINE SULFATE AND AMPHETAMINE SULFATE 5; 5; 5; 5 MG/1; MG/1; MG/1; MG/1
TABLET ORAL
Qty: 30 TABLET | Refills: 0 | Status: SHIPPED | OUTPATIENT
Start: 2025-02-13

## 2025-02-13 NOTE — TELEPHONE ENCOUNTER
Medication: Adderall 20 mg     Date of last refill:01/23/2025  [place]      Last office visit: 01/15/2025  Due back to clinic per last office note:    Date next office visit scheduled:    Future Appointments   Date Time Provider Department Center   6/18/2025 10:20 AM Tito Gaines MD [facility]           Last OV note recommendation:    Discussion plus Diagnostics & Treatment Orders:  We are going to add a 20 mg regular tablet of Adderall at noon time which she can reduce to half a tablet depending on the need.  Advised to continue doing weight and reduction or skipping of doses to prevent development of tachyphylaxis from the ADD drugs.     Migraine management is the same.        (x) Discussed potential side effects of any treatment relevant to above.  Includes explanation of tests as necessary.

## 2025-02-20 DIAGNOSIS — F98.8 ATTENTION DEFICIT DISORDER (ADD) WITHOUT HYPERACTIVITY: ICD-10-CM

## 2025-02-20 RX ORDER — DEXTROAMPHETAMINE SACCHARATE, AMPHETAMINE ASPARTATE MONOHYDRATE, DEXTROAMPHETAMINE SULFATE AND AMPHETAMINE SULFATE 5; 5; 5; 5 MG/1; MG/1; MG/1; MG/1
20 CAPSULE, EXTENDED RELEASE ORAL DAILY
Qty: 30 CAPSULE | Refills: 0 | Status: SHIPPED | OUTPATIENT
Start: 2025-02-20 | End: 2025-03-22

## 2025-02-20 NOTE — TELEPHONE ENCOUNTER
Medication: Adderall 20 mg XR     Date of last refill:01/23/2025  [place]23[place]2025     Last office visit: 01/15/2025  Due back to clinic per last office note:    Date next office visit scheduled:           Future Appointments   Date Time Provider Department Center   6/18/2025 10:20 AM Tito Gaines MD [facility] [place]            Last OV note recommendation:     Discussion plus Diagnostics & Treatment Orders:  We are going to add a 20 mg regular tablet of Adderall at noon time which she can reduce to half a tablet depending on the need.  Advised to continue doing weight and reduction or skipping of doses to prevent development of tachyphylaxis from the ADD drugs.     Migraine management is the same.        (x) Discussed potential side effects of any treatment relevant to above.  Includes explanation of tests as necessary.

## 2025-03-05 DIAGNOSIS — G43.009 MIGRAINE WITHOUT AURA AND WITHOUT STATUS MIGRAINOSUS, NOT INTRACTABLE: ICD-10-CM

## 2025-03-05 RX ORDER — ELETRIPTAN HYDROBROMIDE 40 MG/1
TABLET, FILM COATED ORAL
Qty: 18 TABLET | Refills: 11 | Status: SHIPPED | OUTPATIENT
Start: 2025-03-05 | End: 2025-03-06

## 2025-03-05 NOTE — TELEPHONE ENCOUNTER
Medication: Eletriptan Hydrobromide 40 MG      Date of last refill: 8/21/24 (#18/11R)  [place]     Last office visit: 1/15/25  Due back to clinic per last office note:  6/2025  Date next office visit scheduled:    Future Appointments   Date Time Provider Department Center   6/18/2025 10:20 AM Tito Gaines MD [facility] [place]           Last OV note recommendation:    Problem/s Identified this visit:   1. Attention deficit disorder (ADD) without hyperactivity    2. Migraine without aura and without status migrainosus, not intractable             Discussion plus Diagnostics & Treatment Orders:  We are going to add a 20 mg regular tablet of Adderall at noon time which she can reduce to half a tablet depending on the need.  Advised to continue doing weight and reduction or skipping of doses to prevent development of tachyphylaxis from the ADD drugs.     Migraine management is the same.

## 2025-03-06 DIAGNOSIS — G43.009 MIGRAINE WITHOUT AURA AND WITHOUT STATUS MIGRAINOSUS, NOT INTRACTABLE: ICD-10-CM

## 2025-03-06 RX ORDER — ELETRIPTAN HYDROBROMIDE 40 MG/1
TABLET, FILM COATED ORAL
Qty: 18 TABLET | Refills: 11 | Status: SHIPPED | OUTPATIENT
Start: 2025-03-06

## 2025-03-16 DIAGNOSIS — G43.009 MIGRAINE WITHOUT AURA AND WITHOUT STATUS MIGRAINOSUS, NOT INTRACTABLE: ICD-10-CM

## 2025-03-16 DIAGNOSIS — F98.8 ATTENTION DEFICIT DISORDER (ADD) WITHOUT HYPERACTIVITY: ICD-10-CM

## 2025-03-17 RX ORDER — DEXTROAMPHETAMINE SACCHARATE, AMPHETAMINE ASPARTATE, DEXTROAMPHETAMINE SULFATE AND AMPHETAMINE SULFATE 5; 5; 5; 5 MG/1; MG/1; MG/1; MG/1
TABLET ORAL
Qty: 30 TABLET | Refills: 0 | Status: SHIPPED | OUTPATIENT
Start: 2025-03-17

## 2025-03-17 RX ORDER — CYANOCOBALAMIN 1000 UG/ML
1000 INJECTION, SOLUTION INTRAMUSCULAR; SUBCUTANEOUS
Qty: 10 ML | Refills: 1 | Status: SHIPPED | OUTPATIENT
Start: 2025-03-17

## 2025-03-17 RX ORDER — DEXTROAMPHETAMINE SACCHARATE, AMPHETAMINE ASPARTATE MONOHYDRATE, DEXTROAMPHETAMINE SULFATE AND AMPHETAMINE SULFATE 5; 5; 5; 5 MG/1; MG/1; MG/1; MG/1
20 CAPSULE, EXTENDED RELEASE ORAL DAILY
Qty: 30 CAPSULE | Refills: 0 | Status: SHIPPED | OUTPATIENT
Start: 2025-03-17 | End: 2025-04-16

## 2025-03-17 NOTE — TELEPHONE ENCOUNTER
Medication: Adderall 20 mg     Date of last refill:02/14/2025 & 02/20/2025  [place]      Last office visit: 01/15/2025  Due back to clinic per last office note:    Date next office visit scheduled:           Future Appointments   Date Time Provider Department Center   6/18/2025 10:20 AM Tito Gaines MD [facility] [place]            Last OV note recommendation:     Discussion plus Diagnostics & Treatment Orders:  We are going to add a 20 mg regular tablet of Adderall at noon time which she can reduce to half a tablet depending on the need.  Advised to continue doing weight and reduction or skipping of doses to prevent development of tachyphylaxis from the ADD drugs.     Migraine management is the same.        (x) Discussed potential side effects of any treatment relevant to above.  Includes explanation of tests as necessary.

## 2025-03-17 NOTE — TELEPHONE ENCOUNTER
Medication: cyanocobalamin 1000 MCG/ML      Date of last refill: 1/8/24 (10mL/1R)  [place]     Last office visit: 1/15/25  Due back to clinic per last office note:  scheduled F/U  Date next office visit scheduled:    Future Appointments   Date Time Provider Department Center   6/18/2025 10:20 AM Tito Gaines MD [facility] [place]           Last OV note recommendation:    Problem/s Identified this visit:   1. Attention deficit disorder (ADD) without hyperactivity    2. Migraine without aura and without status migrainosus, not intractable             Discussion plus Diagnostics & Treatment Orders:  We are going to add a 20 mg regular tablet of Adderall at noon time which she can reduce to half a tablet depending on the need.  Advised to continue doing weight and reduction or skipping of doses to prevent development of tachyphylaxis from the ADD drugs.     Migraine management is the same.

## 2025-03-18 DIAGNOSIS — F98.8 ATTENTION DEFICIT DISORDER (ADD) WITHOUT HYPERACTIVITY: ICD-10-CM

## 2025-03-19 RX ORDER — DEXTROAMPHETAMINE SACCHARATE, AMPHETAMINE ASPARTATE MONOHYDRATE, DEXTROAMPHETAMINE SULFATE AND AMPHETAMINE SULFATE 5; 5; 5; 5 MG/1; MG/1; MG/1; MG/1
20 CAPSULE, EXTENDED RELEASE ORAL DAILY
Qty: 30 CAPSULE | Refills: 0 | Status: SHIPPED | OUTPATIENT
Start: 2025-03-19 | End: 2025-04-18

## 2025-03-19 NOTE — TELEPHONE ENCOUNTER
The [facility] in [place] does not have this and they can’t order it. Can you please pull the prescription from [facility] [place] and send to the [facility] in [place]?

## 2025-04-18 DIAGNOSIS — F98.8 ATTENTION DEFICIT DISORDER (ADD) WITHOUT HYPERACTIVITY: ICD-10-CM

## 2025-04-18 DIAGNOSIS — G43.009 MIGRAINE WITHOUT AURA AND WITHOUT STATUS MIGRAINOSUS, NOT INTRACTABLE: ICD-10-CM

## 2025-04-18 RX ORDER — DEXTROAMPHETAMINE SACCHARATE, AMPHETAMINE ASPARTATE MONOHYDRATE, DEXTROAMPHETAMINE SULFATE AND AMPHETAMINE SULFATE 5; 5; 5; 5 MG/1; MG/1; MG/1; MG/1
20 CAPSULE, EXTENDED RELEASE ORAL DAILY
Qty: 30 CAPSULE | Refills: 0 | Status: SHIPPED | OUTPATIENT
Start: 2025-04-18 | End: 2025-05-18

## 2025-04-18 RX ORDER — DEXTROAMPHETAMINE SACCHARATE, AMPHETAMINE ASPARTATE, DEXTROAMPHETAMINE SULFATE AND AMPHETAMINE SULFATE 5; 5; 5; 5 MG/1; MG/1; MG/1; MG/1
TABLET ORAL
Qty: 30 TABLET | Refills: 0 | Status: SHIPPED | OUTPATIENT
Start: 2025-04-18

## 2025-04-18 NOTE — TELEPHONE ENCOUNTER
Patient ensuring message request for both versions of Adderall will be processed.     Please advise.

## 2025-04-18 NOTE — TELEPHONE ENCOUNTER
Medication: Adderall 20 mg     Date of last refill:03/19/2025  [place]      Last office visit: 01/15/2025  Due back to clinic per last office note:    Date next office visit scheduled:                Future Appointments   Date Time Provider Department Center   6/18/2025 10:20 AM Tito Gaines MD [facility]            Last OV note recommendation:     Discussion plus Diagnostics & Treatment Orders:  We are going to add a 20 mg regular tablet of Adderall at noon time which she can reduce to half a tablet depending on the need.  Advised to continue doing weight and reduction or skipping of doses to prevent development of tachyphylaxis from the ADD drugs.     Migraine management is the same.        (x) Discussed potential side effects of any treatment relevant to above.  Includes explanation of tests as necessary.

## 2025-05-20 ENCOUNTER — PATIENT MESSAGE (OUTPATIENT)
Dept: NEUROLOGY | Facility: CLINIC | Age: 45
End: 2025-05-20

## 2025-05-20 DIAGNOSIS — F98.8 ATTENTION DEFICIT DISORDER (ADD) WITHOUT HYPERACTIVITY: ICD-10-CM

## 2025-05-20 DIAGNOSIS — G43.009 MIGRAINE WITHOUT AURA AND WITHOUT STATUS MIGRAINOSUS, NOT INTRACTABLE: ICD-10-CM

## 2025-05-20 RX ORDER — DEXTROAMPHETAMINE SACCHARATE, AMPHETAMINE ASPARTATE, DEXTROAMPHETAMINE SULFATE AND AMPHETAMINE SULFATE 5; 5; 5; 5 MG/1; MG/1; MG/1; MG/1
TABLET ORAL
Qty: 30 TABLET | Refills: 0 | Status: CANCELLED | OUTPATIENT
Start: 2025-05-20

## 2025-05-20 RX ORDER — DEXTROAMPHETAMINE SACCHARATE, AMPHETAMINE ASPARTATE MONOHYDRATE, DEXTROAMPHETAMINE SULFATE AND AMPHETAMINE SULFATE 5; 5; 5; 5 MG/1; MG/1; MG/1; MG/1
20 CAPSULE, EXTENDED RELEASE ORAL DAILY
Qty: 30 CAPSULE | Refills: 0 | Status: CANCELLED | OUTPATIENT
Start: 2025-05-20

## 2025-05-20 NOTE — TELEPHONE ENCOUNTER
Patient has questions on her medications Adderral she is having trouble refilling her meds on line   She needs the adderal 20mg and the adderall XR, please call pt

## 2025-05-20 NOTE — TELEPHONE ENCOUNTER
Medication: amphetamine-dextroamphetamine (ADDERALL) 20 MG      Date of last refill: 4/18/25 (#30/0R)  [place]18/25    Medication: Amphetamine-Dextroamphet ER (ADDERALL XR) 20 MG      Date of last refill: 4/18/25 (#30/0R)  [place] 4[place]18/25     Last office visit: 1/15/25  Due back to clinic per last office note:  Keep the scheduled June 2025 follow-up   Date next office visit scheduled:    Future Appointments   Date Time Provider Department Center   6/18/2025 10:20 AM Tito Gaines MD [facility]           Last OV note recommendation:      Problem/s Identified this visit:   1. Attention deficit disorder (ADD) without hyperactivity    2. Migraine without aura and without status migrainosus, not intractable             Discussion plus Diagnostics & Treatment Orders:  We are going to add a 20 mg regular tablet of Adderall at noon time which she can reduce to half a tablet depending on the need.  Advised to continue doing weight and reduction or skipping of doses to prevent development of tachyphylaxis from the ADD drugs.     Migraine management is the same.

## 2025-05-20 NOTE — TELEPHONE ENCOUNTER
This is a duplicate refill request encounter.  See Telephone encounter dated 5/20/25 for refill request

## 2025-05-20 NOTE — TELEPHONE ENCOUNTER
Pt notified that there is an active pending request to provider.  Notified pt MD is out of office until 5/22/25

## 2025-05-20 NOTE — TELEPHONE ENCOUNTER
Medication: amphetamine-dextroamphetamine (ADDERALL) 20 MG      Date of last refill: 4/18/25 (#30/0R)  [place]25     Last office visit: 1/15/25  Due back to clinic per last office note:  6/2025  Date next office visit scheduled:    Future Appointments   Date Time Provider Department Center   6/18/2025 10:20 AM Tito Gaines MD [facility]           Last OV note recommendation:    Problem/s Identified this visit:   1. Attention deficit disorder (ADD) without hyperactivity    2. Migraine without aura and without status migrainosus, not intractable             Discussion plus Diagnostics & Treatment Orders:  We are going to add a 20 mg regular tablet of Adderall at noon time which she can reduce to half a tablet depending on the need.  Advised to continue doing weight and reduction or skipping of doses to prevent development of tachyphylaxis from the ADD drugs.     Migraine management is the same.

## 2025-05-21 RX ORDER — DEXTROAMPHETAMINE SACCHARATE, AMPHETAMINE ASPARTATE MONOHYDRATE, DEXTROAMPHETAMINE SULFATE AND AMPHETAMINE SULFATE 5; 5; 5; 5 MG/1; MG/1; MG/1; MG/1
20 CAPSULE, EXTENDED RELEASE ORAL EVERY MORNING
COMMUNITY
End: 2025-05-23

## 2025-05-23 RX ORDER — DEXTROAMPHETAMINE SACCHARATE, AMPHETAMINE ASPARTATE MONOHYDRATE, DEXTROAMPHETAMINE SULFATE AND AMPHETAMINE SULFATE 5; 5; 5; 5 MG/1; MG/1; MG/1; MG/1
20 CAPSULE, EXTENDED RELEASE ORAL EVERY MORNING
Qty: 30 CAPSULE | Refills: 0 | Status: SHIPPED | OUTPATIENT
Start: 2025-05-23

## 2025-05-23 RX ORDER — DEXTROAMPHETAMINE SACCHARATE, AMPHETAMINE ASPARTATE, DEXTROAMPHETAMINE SULFATE AND AMPHETAMINE SULFATE 5; 5; 5; 5 MG/1; MG/1; MG/1; MG/1
TABLET ORAL
Qty: 30 TABLET | Refills: 0 | Status: SHIPPED | OUTPATIENT
Start: 2025-05-23

## 2025-05-23 NOTE — TELEPHONE ENCOUNTER
Apologized for delay of medication. Dr. Gaines out of office. Dr. Garvin agreeable to signing prescription. Patient notified via [employer] message.     See Telephone encounter dated 5/20/25 for new orders.

## 2025-05-23 NOTE — TELEPHONE ENCOUNTER
Patient calling, advised has been waiting for both adderall medications 20mg tablet AND 20mg ER.     Please advise filling and sending to:    [id number] - [place], [place] - [facility] [address] AT [facility] [address], [phone], [phone]

## 2025-05-23 NOTE — TELEPHONE ENCOUNTER
Called and verified with pharmacy that they do not have any pended orders for patient's prescriptions.     Apologized for delay of medication. Dr. Gaines out of office. Dr. Garvin agreeable to signing prescription. Patient notified via [employer] message. (Encounter closed)    New orders pended and sent to Dr. Garvin to sign.     Patient called and notified and expressed understanding.

## 2025-05-27 DIAGNOSIS — G43.009 MIGRAINE WITHOUT AURA AND WITHOUT STATUS MIGRAINOSUS, NOT INTRACTABLE: ICD-10-CM

## 2025-05-27 RX ORDER — ELETRIPTAN HYDROBROMIDE 40 MG/1
TABLET, FILM COATED ORAL
Qty: 18 TABLET | Refills: 11 | Status: CANCELLED | OUTPATIENT
Start: 2025-05-27

## 2025-05-27 NOTE — TELEPHONE ENCOUNTER
Medication: Eletriptan 40 MG      Date of last refill: 03/06/2025(#18/11R)  [place]    Medication: Amphetamine-Dextroamphet ER (ADDERALL XR) 20 MG      Date of last refill: 4/18/25 (#30/0R)  [place]18/25     Last office visit: 1/15/25  Due back to clinic per last office note:  Keep the scheduled June 2025 follow-up   Date next office visit scheduled:           Future Appointments   Date Time Provider Department Center   6/18/2025 10:20 AM Tito Gaines MD [facility]            Last OV note recommendation:       Problem/s Identified this visit:   1. Attention deficit disorder (ADD) without hyperactivity    2. Migraine without aura and without status migrainosus, not intractable             Discussion plus Diagnostics & Treatment Orders:  We are going to add a 20 mg regular tablet of Adderall at noon time which she can reduce to half a tablet depending on the need.  Advised to continue doing weight and reduction or skipping of doses to prevent development of tachyphylaxis from the ADD drugs.     Migraine management is the same.

## 2025-06-18 ENCOUNTER — OFFICE VISIT (OUTPATIENT)
Dept: NEUROLOGY | Facility: CLINIC | Age: 45
End: 2025-06-18
Payer: COMMERCIAL

## 2025-06-18 VITALS
WEIGHT: 290 LBS | SYSTOLIC BLOOD PRESSURE: 130 MMHG | DIASTOLIC BLOOD PRESSURE: 78 MMHG | HEIGHT: 68 IN | HEART RATE: 66 BPM | BODY MASS INDEX: 43.95 KG/M2 | RESPIRATION RATE: 16 BRPM

## 2025-06-18 DIAGNOSIS — F98.8 ATTENTION DEFICIT DISORDER (ADD) WITHOUT HYPERACTIVITY: ICD-10-CM

## 2025-06-18 DIAGNOSIS — G43.009 MIGRAINE WITHOUT AURA AND WITHOUT STATUS MIGRAINOSUS, NOT INTRACTABLE: ICD-10-CM

## 2025-06-18 PROCEDURE — 3008F BODY MASS INDEX DOCD: CPT | Performed by: OTHER

## 2025-06-18 PROCEDURE — 3078F DIAST BP <80 MM HG: CPT | Performed by: OTHER

## 2025-06-18 PROCEDURE — 3075F SYST BP GE 130 - 139MM HG: CPT | Performed by: OTHER

## 2025-06-18 PROCEDURE — 99214 OFFICE O/P EST MOD 30 MIN: CPT | Performed by: OTHER

## 2025-06-18 RX ORDER — DEXTROAMPHETAMINE SACCHARATE, AMPHETAMINE ASPARTATE, DEXTROAMPHETAMINE SULFATE AND AMPHETAMINE SULFATE 5; 5; 5; 5 MG/1; MG/1; MG/1; MG/1
20 TABLET ORAL DAILY
Qty: 30 TABLET | Refills: 0 | Status: SHIPPED | OUTPATIENT
Start: 2025-07-22 | End: 2025-08-21

## 2025-06-18 RX ORDER — DEXTROAMPHETAMINE SACCHARATE, AMPHETAMINE ASPARTATE MONOHYDRATE, DEXTROAMPHETAMINE SULFATE AND AMPHETAMINE SULFATE 5; 5; 5; 5 MG/1; MG/1; MG/1; MG/1
20 CAPSULE, EXTENDED RELEASE ORAL EVERY MORNING
Qty: 30 CAPSULE | Refills: 0 | Status: SHIPPED | OUTPATIENT
Start: 2025-06-18

## 2025-06-18 RX ORDER — DEXTROAMPHETAMINE SACCHARATE, AMPHETAMINE ASPARTATE, DEXTROAMPHETAMINE SULFATE AND AMPHETAMINE SULFATE 5; 5; 5; 5 MG/1; MG/1; MG/1; MG/1
20 TABLET ORAL DAILY
Qty: 30 TABLET | Refills: 0 | Status: SHIPPED | OUTPATIENT
Start: 2025-07-19 | End: 2025-08-18

## 2025-06-18 RX ORDER — DEXTROAMPHETAMINE SACCHARATE, AMPHETAMINE ASPARTATE, DEXTROAMPHETAMINE SULFATE AND AMPHETAMINE SULFATE 5; 5; 5; 5 MG/1; MG/1; MG/1; MG/1
TABLET ORAL
Qty: 30 TABLET | Refills: 0 | Status: SHIPPED | OUTPATIENT
Start: 2025-06-18

## 2025-06-18 RX ORDER — DEXTROAMPHETAMINE SACCHARATE, AMPHETAMINE ASPARTATE, DEXTROAMPHETAMINE SULFATE AND AMPHETAMINE SULFATE 5; 5; 5; 5 MG/1; MG/1; MG/1; MG/1
20 TABLET ORAL DAILY
Qty: 30 TABLET | Refills: 0 | Status: SHIPPED | OUTPATIENT
Start: 2025-09-20 | End: 2025-10-20

## 2025-06-18 RX ORDER — TIRZEPATIDE 15 MG/.5ML
15 INJECTION, SOLUTION SUBCUTANEOUS WEEKLY
COMMUNITY

## 2025-06-18 RX ORDER — AMITRIPTYLINE HYDROCHLORIDE 10 MG/1
TABLET ORAL NIGHTLY
COMMUNITY
Start: 2025-03-26

## 2025-06-18 RX ORDER — DEXTROAMPHETAMINE SACCHARATE, AMPHETAMINE ASPARTATE MONOHYDRATE, DEXTROAMPHETAMINE SULFATE AND AMPHETAMINE SULFATE 5; 5; 5; 5 MG/1; MG/1; MG/1; MG/1
20 CAPSULE, EXTENDED RELEASE ORAL DAILY
Qty: 30 CAPSULE | Refills: 0 | Status: SHIPPED | OUTPATIENT
Start: 2025-07-22 | End: 2025-08-21

## 2025-06-18 RX ORDER — DEXTROAMPHETAMINE SACCHARATE, AMPHETAMINE ASPARTATE MONOHYDRATE, DEXTROAMPHETAMINE SULFATE AND AMPHETAMINE SULFATE 5; 5; 5; 5 MG/1; MG/1; MG/1; MG/1
20 CAPSULE, EXTENDED RELEASE ORAL DAILY
Qty: 30 CAPSULE | Refills: 0 | Status: SHIPPED | OUTPATIENT
Start: 2025-09-20 | End: 2025-10-20

## 2025-06-18 RX ORDER — DEXTROAMPHETAMINE SACCHARATE, AMPHETAMINE ASPARTATE MONOHYDRATE, DEXTROAMPHETAMINE SULFATE AND AMPHETAMINE SULFATE 5; 5; 5; 5 MG/1; MG/1; MG/1; MG/1
20 CAPSULE, EXTENDED RELEASE ORAL DAILY
Qty: 30 CAPSULE | Refills: 0 | Status: SHIPPED | OUTPATIENT
Start: 2025-08-21 | End: 2025-09-20

## 2025-06-18 NOTE — PATIENT INSTRUCTIONS
Refill policies:    Allow 2-3 business days for refills; controlled substances may take longer.  Contact your pharmacy at least 5 days prior to running out of medication and have them send an electronic request or submit request through the “request refill” option in your [employer] account.  Refills are not addressed on weekends; covering physicians do not authorize routine medications on weekends.  No narcotics or controlled substances are refilled after noon on Fridays or by on call physicians.  By law, narcotics must be electronically prescribed.  A 30 day supply with no refills is the maximum allowed.  If your prescription is due for a refill, you may be due for a follow up appointment.  To best provide you care, patients receiving routine medications need to be seen at least once a year.  Patients receiving narcotic/controlled substance medications need to be seen at least once every 3 months.  In the event that your preferred pharmacy does not have the requested medication in stock (e.g. Backordered), it is your responsibility to find another pharmacy that has the requested medication available.  We will gladly send a new prescription to that pharmacy at your request.    Scheduling Tests:    If your physician has ordered radiology tests such as MRI or CT scans, please contact Central Scheduling at [phone] right away to schedule the test.  Once scheduled, the [facility] Centralized Referral Team will work with your insurance carrier to obtain pre-certification or prior authorization.  Depending on your insurance carrier, approval may take 3-10 days.  It is highly recommended patients assure they have received an authorization before having a test performed.  If test is done without insurance authorization, patient may be responsible for the entire amount billed.      Precertification and Prior Authorizations:  If your physician has recommended that you have a procedure or additional testing performed the [facility]  Centralized Referral Team will contact your insurance carrier to obtain pre-certification or prior authorization.    You are strongly encouraged to contact your insurance carrier to verify that your procedure/test has been approved and is a COVERED benefit.  Although the [facility] Centralized Referral Team does its due diligence, the insurance carrier gives the disclaimer that \"Although the procedure is authorized, this does not guarantee payment.\"    Ultimately the patient is responsible for payment.   Thank you for your understanding in this matter.  Paperwork Completion:  If you require FMLA or disability paperwork for your recovery, please make sure to either drop it off or have it faxed to our office at [phone]. Be sure the form has your name and date of birth on it.  The form will be faxed to our Forms Department and they will complete it for you.  There is a 25$ fee for all forms that need to be filled out.  Please be aware there is a 10-14 day turnaround time.  You will need to sign a release of information (TOVA) form if your paperwork does not come with one.  You may call the Forms Department with any questions at [phone].  Their fax number is [phone].

## 2025-06-18 NOTE — PROGRESS NOTES
NEUROLOGY  [facility]       Cele Castillo  4/28/1980  Primary Care Provider:  Vivienne Cruz MD    6/18/2025  45 year old yo,     Assessment & plans last visit:  Last seen January    Previous visit and existing record notes reviewed in preparation for the face to face visit.  Relevant labs and studies reviewed and will be noted in relevant areas of this record.  Accompanied visit:     (x) No.      Present condition:  Her migraines are manageable and responds very well to eletriptan.  She has not had that many.    Her attention deficit is the one that bothers her and however the increased dose last time made a big difference.  There are still times when she forgets it and she would have trouble completing tasks.  She does not have any side effects and she does not have any hypertension or any cardiac disease although she is being followed for mitral valve prolapse.    Past History Reviewed & update/new problem(s): No new medical problems    Review of Systems:  Review of Systems:  Denies systemic symptoms.     No CP or SOB.  No GI or  symptoms. Relevant Neuro as noted above.      Medications:    Medications - Current[1]  PRN: PRN Medications[2]    Allergies:  Allergies[3]       EXAM:  /78 (BP Location: Left arm, Patient Position: Sitting, Cuff Size: large)   Pulse 66   Resp 16   Ht 68\"   Wt 290 lb (131.5 kg)   BMI 44.09 kg/m²   Looks stated age  General Exam:  HENT:  pink conjunctiva anicteric sclerae  Neck no adenopathy, thyroid normal  Heart and Lungs:  normal  Extremities: no cyanosis, skin changes    NEURO  NEURO  Able to relate events with fluent speech and intact comprehension  CN 2-12: pupils reactive, VF full face symmetric sensation and movement tongue midline  No motor focal findings  Sensory: no lateralizing findings  Reflexes are symmetric  UMN signs: none  Gait: narrow based          INTERPRETATION of RELEVANT LABS and other DATA:          Problem/s Identified  this visit:   1. Attention deficit disorder (ADD) without hyperactivity    2. Migraine without aura and without status migrainosus, not intractable          Discussion plus Diagnostics & Treatment Orders:  PANEL RX given for her ADDERALL  Orders Placed This Encounter    amitriptyline 10 MG Oral Tab     Sig: Take 2-3 tablets (20-30 mg total) by mouth nightly.    Tirzepatide-Weight Management (ZEPBOUND) 15 MG/0.5ML Subcutaneous Solution Auto-injector     Sig: Inject 15 mL into the skin once a week.    Amphetamine-Dextroamphet ER 20 MG Oral Capsule SR 24 Hr     Sig: Take 1 capsule (20 mg total) by mouth every morning.     Dispense:  30 capsule     Refill:  0    amphetamine-dextroamphetamine (ADDERALL) 20 MG Oral Tab     Si tablet at noontime, no later than 1PM     Dispense:  30 tablet     Refill:  0    Amphetamine-Dextroamphet ER (ADDERALL XR) 20 MG Oral Capsule SR 24 Hr     Sig: Take 1 capsule (20 mg total) by mouth daily.     Dispense:  30 capsule     Refill:  0    Amphetamine-Dextroamphet ER (ADDERALL XR) 20 MG Oral Capsule SR 24 Hr     Sig: Take 1 capsule (20 mg total) by mouth daily.     Dispense:  30 capsule     Refill:  0    Amphetamine-Dextroamphet ER (ADDERALL XR) 20 MG Oral Capsule SR 24 Hr     Sig: Take 1 capsule (20 mg total) by mouth daily.     Dispense:  30 capsule     Refill:  0    amphetamine-dextroamphetamine (ADDERALL) 20 MG Oral Tab     Sig: Take 1 tablet (20 mg total) by mouth daily.     Dispense:  30 tablet     Refill:  0    amphetamine-dextroamphetamine (ADDERALL) 20 MG Oral Tab     Sig: Take 1 tablet (20 mg total) by mouth daily.     Dispense:  30 tablet     Refill:  0    amphetamine-dextroamphetamine (ADDERALL) 20 MG Oral Tab     Sig: Take 1 tablet (20 mg total) by mouth daily.     Dispense:  30 tablet     Refill:  0           (x) Discussed potential side effects of any treatment relevant to above.  Includes explanation of tests as necessary.    Return in about 8 months (around  2/18/2026).      Patient understands that if needed, based on condition and or test results, follow up will be readjusted      Tito Gaines MD  Vascular & General Neurology  Director, Multiple Sclerosis Program  [facility]  6/18/2025, Time completed 10:23 AM    Decision making:  ( x ) labs reviewed/ordered - 1  (  ) new diagnosis: - 1  ( x) Images & studies independently reviewed -non F2F  (  ) Case/studies discussed with other caregivers - -non F2F  (  ) Telephone time with patiern or authorized [facility] member--non F2F  ( x ) other records reviewed --non F2F including consultations  (  ) [facility] meetings - patient not present --non F2F  (  ) Independent Historian obtained    Non Face to Face CPT code 50402/50943 applies as documented above    PROCEDURE DONE     (   ) see notes        After visit, patient was escorted out and handed-off discharge process and instructions to the check out desk.  No additional issues relevant to visit were raised to staff at this time interval.        This document is to be interpreted as my current opinion regarding the case as of the stated date of service based on the information available to me at this time and may supersedes any prior opinion expressed either orally or in writing.  Services rendered are only within the scope of direct medical care  Sometimes, reports may have been prepared partially using a speech recognition software technology.  If a word or phrase is confusing or out of context, please do not hesitate to call for clarification.              [1]   Current Outpatient Medications:     amitriptyline 10 MG Oral Tab, Take 2-3 tablets (20-30 mg total) by mouth nightly., Disp: , Rfl:     Tirzepatide-Weight Management (ZEPBOUND) 15 MG/0.5ML Subcutaneous Solution Auto-injector, Inject 15 mL into the skin once a week., Disp: , Rfl:     Amphetamine-Dextroamphet ER 20 MG Oral Capsule SR 24 Hr, Take 1 capsule (20 mg total) by mouth every morning., Disp: 30  capsule, Rfl: 0    amphetamine-dextroamphetamine (ADDERALL) 20 MG Oral Tab, 1 tablet at noontime, no later than 1PM, Disp: 30 tablet, Rfl: 0    [START ON 7/22/2025] Amphetamine-Dextroamphet ER (ADDERALL XR) 20 MG Oral Capsule SR 24 Hr, Take 1 capsule (20 mg total) by mouth daily., Disp: 30 capsule, Rfl: 0    [START ON 8/21/2025] Amphetamine-Dextroamphet ER (ADDERALL XR) 20 MG Oral Capsule SR 24 Hr, Take 1 capsule (20 mg total) by mouth daily., Disp: 30 capsule, Rfl: 0    [START ON 9/20/2025] Amphetamine-Dextroamphet ER (ADDERALL XR) 20 MG Oral Capsule SR 24 Hr, Take 1 capsule (20 mg total) by mouth daily., Disp: 30 capsule, Rfl: 0    [START ON 7/22/2025] amphetamine-dextroamphetamine (ADDERALL) 20 MG Oral Tab, Take 1 tablet (20 mg total) by mouth daily., Disp: 30 tablet, Rfl: 0    [START ON 7/19/2025] amphetamine-dextroamphetamine (ADDERALL) 20 MG Oral Tab, Take 1 tablet (20 mg total) by mouth daily., Disp: 30 tablet, Rfl: 0    [START ON 9/20/2025] amphetamine-dextroamphetamine (ADDERALL) 20 MG Oral Tab, Take 1 tablet (20 mg total) by mouth daily., Disp: 30 tablet, Rfl: 0    cyanocobalamin 1000 MCG/ML Injection Solution, Inject 1 mL (1,000 mcg total) into the muscle every 14 (fourteen) days., Disp: 10 mL, Rfl: 1    Eletriptan Hydrobromide 40 MG Oral Tab, use at onset; may repeat once after 4 hours- ONLY 2 IN 24 HOUR PERIOD MAX.  This is a 30 day supply., Disp: 18 tablet, Rfl: 11    hydrocortisone 2.5 % External Cream, , Disp: , Rfl:     BD INSULIN SYRINGE U/F 30G X 1/2\" 1 ML Does not apply Misc, , Disp: , Rfl:     levothyroxine 125 MCG Oral Tab, Take 1 tablet (125 mcg total) by mouth before breakfast., Disp: , Rfl:     FLUTICASONE PROPIONATE NA, by Nasal route., Disp: , Rfl:     ergocalciferol 68633 UNITS Oral Cap, Take 1 capsule (50,000 Units total) by mouth once a week., Disp: , Rfl: 1    Omeprazole 40 MG Oral Capsule Delayed Release, Take 1 capsule (40 mg total) by mouth daily., Disp: , Rfl:     Spironolactone 50  MG Oral Tab, Take 1 tablet (50 mg total) by mouth daily., Disp: , Rfl:   [2] [3]   Allergies  Allergen Reactions    Acetaminophen OTHER (SEE COMMENTS) and UNKNOWN     Elevated liver enzymes    Elevated liver enzymes ? tylenol- was in hospital at time after ankle surgery w IV meds and other meds - other variables. Has tolerated hydrocodone- acetaminophen in past     NOT ALLERGIC    Elevated liver enzymes ? tylenol- was in hospital at time after ankle surgery w IV meds and other meds - other variables. Has tolerated hydrocodone- acetaminophen in past    Pt states she is not allergic    Neomycin-Bacitracin-Polymyxin OTHER (SEE COMMENTS)      External   External    Triamcinolone OTHER (SEE COMMENTS)     From dermatologist  From dermatologist    Bacitracin OTHER (SEE COMMENTS)      External    Zonisamide OTHER (SEE COMMENTS)     Nerve shooting pain

## 2025-08-01 ENCOUNTER — OFFICE VISIT (OUTPATIENT)
Dept: FAMILY MEDICINE CLINIC | Facility: CLINIC | Age: 45
End: 2025-08-01

## 2025-08-01 VITALS
HEIGHT: 65 IN | SYSTOLIC BLOOD PRESSURE: 130 MMHG | HEART RATE: 94 BPM | BODY MASS INDEX: 48.32 KG/M2 | WEIGHT: 290 LBS | OXYGEN SATURATION: 99 % | RESPIRATION RATE: 18 BRPM | TEMPERATURE: 98 F | DIASTOLIC BLOOD PRESSURE: 84 MMHG

## 2025-08-01 DIAGNOSIS — J01.90 ACUTE NON-RECURRENT SINUSITIS, UNSPECIFIED LOCATION: Primary | ICD-10-CM

## 2025-08-01 PROCEDURE — 99213 OFFICE O/P EST LOW 20 MIN: CPT | Performed by: PHYSICIAN ASSISTANT

## 2025-08-01 PROCEDURE — 3079F DIAST BP 80-89 MM HG: CPT | Performed by: PHYSICIAN ASSISTANT

## 2025-08-01 PROCEDURE — 3008F BODY MASS INDEX DOCD: CPT | Performed by: PHYSICIAN ASSISTANT

## 2025-08-01 PROCEDURE — 3075F SYST BP GE 130 - 139MM HG: CPT | Performed by: PHYSICIAN ASSISTANT

## 2025-08-01 RX ORDER — METHYLPREDNISOLONE 4 MG/1
TABLET ORAL
Qty: 1 EACH | Refills: 0 | Status: SHIPPED | OUTPATIENT
Start: 2025-08-01

## (undated) DIAGNOSIS — G89.29 CHRONIC PAIN OF RIGHT KNEE: Primary | ICD-10-CM

## (undated) DIAGNOSIS — M25.561 CHRONIC PAIN OF RIGHT KNEE: Primary | ICD-10-CM

## (undated) NOTE — MR AVS SNAPSHOT
[facility]  [address] [facility] [address] [phone]               Thank you for choosing us for your health care visit with Jarek Luna MD.  We are glad to serve you and happy to provide you with this summary of your v ? EFFECTIVE April 1, 2017 PATIENTS MUST  THEIR OWN NARCOTIC PRESCRIPTIONS. ? Written prescriptions must be picked up in office. ? Please allow the office 48-72 hours to fill the prescription. ? Patient must present photo ID at time of . What changed:  Another medication with the same name was removed. Continue taking this medication, and follow the directions you see here.    Commonly known as:  ADDERALL XR   Start taking on:  8/22/2017           cyanocobalamin 1000 MCG/ML Soln   Inject 1, Bring a paper prescription for each of these medications    - Amphetamine-Dextroamphet ER 20 MG Cp24            MyChart     Visit MyChart  You can access your MyChart to more actively manage your health care and view more details from this visit by going

## (undated) NOTE — LETTER
22  \[facility]\"" [facility]  Pre-Operative Clearance Request    Patient Name:   Herson Bowden             :   1980    Surgeon: Dr. Kia Ferguson             Date of Surgery: 22     Surgical Procedure: RIGHT KNEE ARTHROSCOPY CHONDROPLASTY, PMM. Please Complete: 2-3 WEEKS PRIOR TO SURGERY TO AVOID CANCELLATION OF SURGERY. [x]  History and Physical        [x]  Medical  Clearance                     Testing is ordered by [facility] per anesthesia guidelines                                   **Please fax test results, H&P, and clearance to [phone] and to                                [facility] at [phone]**

## (undated) NOTE — LETTER
Date: 9/21/2022    Patient Name: Sendy Ramirez          To Whom it may concern: This letter has been written at the patient's request. The above patient was seen at the [facility] for treatment of a medical condition. Scout Finch    The patient may return to work on 9/22/22 with the following limitations- none.         Sincerely,    Seth Franklin MD